# Patient Record
Sex: FEMALE | Race: WHITE | NOT HISPANIC OR LATINO | Employment: OTHER | URBAN - NONMETROPOLITAN AREA
[De-identification: names, ages, dates, MRNs, and addresses within clinical notes are randomized per-mention and may not be internally consistent; named-entity substitution may affect disease eponyms.]

---

## 2017-08-16 ENCOUNTER — TRANSCRIBE ORDERS (OUTPATIENT)
Dept: ADMINISTRATIVE | Facility: HOSPITAL | Age: 57
End: 2017-08-16

## 2017-08-16 DIAGNOSIS — G62.9 NEUROPATHY: Primary | ICD-10-CM

## 2017-10-04 ENCOUNTER — HOSPITAL ENCOUNTER (OUTPATIENT)
Dept: NEUROLOGY | Facility: HOSPITAL | Age: 57
Discharge: HOME | End: 2017-10-04
Admitting: PHYSICIAN ASSISTANT

## 2017-10-04 DIAGNOSIS — G62.9 NEUROPATHY: ICD-10-CM

## 2017-10-04 PROCEDURE — 95909 NRV CNDJ TST 5-6 STUDIES: CPT | Performed by: PSYCHIATRY & NEUROLOGY

## 2017-10-04 PROCEDURE — 95886 MUSC TEST DONE W/N TEST COMP: CPT

## 2017-10-04 PROCEDURE — 95909 NRV CNDJ TST 5-6 STUDIES: CPT

## 2017-10-04 PROCEDURE — 95886 MUSC TEST DONE W/N TEST COMP: CPT | Performed by: PSYCHIATRY & NEUROLOGY

## 2019-04-24 ENCOUNTER — OFFICE VISIT (OUTPATIENT)
Dept: GASTROENTEROLOGY | Facility: CLINIC | Age: 59
End: 2019-04-24

## 2019-04-24 VITALS
SYSTOLIC BLOOD PRESSURE: 124 MMHG | DIASTOLIC BLOOD PRESSURE: 72 MMHG | WEIGHT: 192 LBS | OXYGEN SATURATION: 100 % | BODY MASS INDEX: 32.78 KG/M2 | HEIGHT: 64 IN | HEART RATE: 77 BPM | TEMPERATURE: 98.1 F

## 2019-04-24 DIAGNOSIS — R10.31 RLQ ABDOMINAL PAIN: ICD-10-CM

## 2019-04-24 DIAGNOSIS — K59.09 OTHER CONSTIPATION: Primary | ICD-10-CM

## 2019-04-24 PROCEDURE — 99213 OFFICE O/P EST LOW 20 MIN: CPT | Performed by: NURSE PRACTITIONER

## 2019-04-24 RX ORDER — DICLOFENAC SODIUM 75 MG/1
75 TABLET, DELAYED RELEASE ORAL 2 TIMES DAILY
COMMUNITY
End: 2020-11-20

## 2019-04-24 RX ORDER — LINACLOTIDE 290 UG/1
1 CAPSULE, GELATIN COATED ORAL DAILY
COMMUNITY
Start: 2019-03-26 | End: 2020-11-20

## 2019-04-24 RX ORDER — TOPIRAMATE 25 MG/1
1 TABLET ORAL 3 TIMES DAILY
COMMUNITY
End: 2023-02-22 | Stop reason: HOSPADM

## 2019-04-24 NOTE — PATIENT INSTRUCTIONS
Constipation, Adult  Constipation is when a person:  · Poops (has a bowel movement) fewer times in a week than normal.  · Has a hard time pooping.  · Has poop that is dry, hard, or bigger than normal.    Follow these instructions at home:  Eating and drinking    · Eat foods that have a lot of fiber, such as:  ? Fresh fruits and vegetables.  ? Whole grains.  ? Beans.  · Eat less of foods that are high in fat, low in fiber, or overly processed, such as:  ? French fries.  ? Hamburgers.  ? Cookies.  ? Candy.  ? Soda.  · Drink enough fluid to keep your pee (urine) clear or pale yellow.  General instructions  · Exercise regularly or as told by your doctor.  · Go to the restroom when you feel like you need to poop. Do not hold it in.  · Take over-the-counter and prescription medicines only as told by your doctor. These include any fiber supplements.  · Do pelvic floor retraining exercises, such as:  ? Doing deep breathing while relaxing your lower belly (abdomen).  ? Relaxing your pelvic floor while pooping.  · Watch your condition for any changes.  · Keep all follow-up visits as told by your doctor. This is important.  Contact a doctor if:  · You have pain that gets worse.  · You have a fever.  · You have not pooped for 4 days.  · You throw up (vomit).  · You are not hungry.  · You lose weight.  · You are bleeding from the anus.  · You have thin, pencil-like poop (stool).  Get help right away if:  · You have a fever, and your symptoms suddenly get worse.  · You leak poop or have blood in your poop.  · Your belly feels hard or bigger than normal (is bloated).  · You have very bad belly pain.  · You feel dizzy or you faint.  This information is not intended to replace advice given to you by your health care provider. Make sure you discuss any questions you have with your health care provider.  Document Released: 06/05/2009 Document Revised: 07/07/2017 Document Reviewed: 06/07/2017  [employer] Interactive Patient Education ©  2019 [employer]

## 2019-04-24 NOTE — PROGRESS NOTES
Chief Complaint:   Chief Complaint   Patient presents with   • Constipation     Pt has been having issues with constipation for the last 6 months-states she was started on Plaquenil right before all of this started; pt was put on lactulose-states that didn't help so she was switched to Linzess and that seems to have helped; Pt stopped taking Plaquenil at the beginning of December because of the constipation   • Abdominal Pain     Pt states when her constipation was the worst-she was having a lot of RLQ pain; states it would wake her up in the middle of the night          Patient ID: Peggy Regan is a 58 y.o. female     History of Present Illness: This is a very pleasant 58-year-old female who was referred to our office for constipation.    The patient was seen at [facility] by JEREMÍAS Montague with complaints of  constipation.  She states she had previously been on Plaquenil but stopped taking this due to constipation still having problems with this. She states that her bowel habits had been normal but then began to have very bad constipation.  She states she normally has a bowel movement every other day until that medication was started.  She was initially started on Lactulose which did not help. She states she was still using stool softeners as well as suppositories.  The patient was started on Linzess 290 mcg 1 p.o. daily.  The patient's last colonoscopy was performed on 10/29/2015 with findings of one colon polyp. She states the Linzess worked well and is still working. She states she was initially have RLQ abdominal pain but that has resolved.    The patient denies any nausea, vomiting, epigastric pain, dysphagia, pyrosis or hematemesis.  The patient denies any fever or chills.  Denies any melena or hematochezia.  Denies any unintentional weight loss or loss of appetite.       Past Medical History:   Diagnosis Date   • Asthma    • Colon polyp    • Cough     multifactorial and adversely affected by  shy and obesity   • Degenerative disc disease, lumbar    • Degenerative disc disease, thoracic    • Depression    • Family history of colonic polyps    • Fibromyalgia    • Functional dyspepsia    • GERD (gastroesophageal reflux disease)    • Hypercholesteremia    • Hypertension    • Sleep apnea     CPAP   • Spinal headache    • Wears glasses        Past Surgical History:   Procedure Laterality Date   • ANTERIOR CERVICAL FUSION  2016   •  SECTION      X2   • CHOLECYSTECTOMY     • COLONOSCOPY  10/29/2015    One 5mm polyp in the sigmoid colon-complete resection, polyp tissue not retrieved; Examination was otherwise normal on direct and retroflexion views; Repeat 5 years    • COLONOSCOPY  2003    Normal; Repeat at age 50   • ENDOSCOPY  2011    Normal esophagus; Normal stomach; Normal examined duodenum; BRAVO capsule placed    • ENDOSCOPY  2010    Suboptimal view due to retained gastric contents; Will check SPGE scan; Will repeat endo/MAC with BRAVO replacement   • ENDOSCOPY  2003    Normal; RTC 3 months   • HYSTERECTOMY     • VENTRAL/INCISIONAL HERNIA REPAIR N/A 2016    Procedure: VENTRAL/INCISIONAL HERNIA REPAIR WITH MESH;  Surgeon: Mckenna Adair MD;  Location: [facility] OR;  Service:          Current Outpatient Medications:   •  albuterol (PROAIR RESPICLICK) 108 (90 BASE) MCG/ACT inhaler, Inhale every 4 (four) hours as needed for wheezing., Disp: , Rfl:   •  Blood Glucose Monitoring Suppl (ACCU-CHEK LAVELL SMARTVIEW) W/DEVICE kit, 3 (three) times a day., Disp: , Rfl:   •  buPROPion XL (WELLBUTRIN XL) 300 MG 24 hr tablet, Take 150 mg by mouth Daily., Disp: , Rfl:   •  cetirizine (ZYRTEC ALLERGY) 10 MG tablet, Take 10 mg by mouth Daily., Disp: , Rfl:   •  diclofenac (VOLTAREN) 75 MG EC tablet, Take 75 mg by mouth 2 (Two) Times a Day., Disp: , Rfl:   •  DULoxetine (CYMBALTA) 60 MG capsule, Take 60 mg by mouth Daily., Disp: , Rfl:   •  fluticasone (FLONASE) 50 MCG/ACT nasal spray,  "2 sprays into each nostril 2 (Two) Times a Day., Disp: , Rfl:   •  LINZESS 290 MCG capsule capsule, Take 1 capsule by mouth Daily., Disp: , Rfl:   •  losartan (COZAAR) 25 MG tablet, Take 25 mg by mouth Daily., Disp: , Rfl:   •  metFORMIN (GLUCOPHAGE) 500 MG tablet, Take 1,000 mg by mouth 2 (Two) Times a Day., Disp: , Rfl:   •  montelukast (SINGULAIR) 10 MG tablet, Take 10 mg by mouth Daily., Disp: , Rfl:   •  pantoprazole (PROTONIX) 40 MG EC tablet, Take 40 mg by mouth 2 (Two) Times a Day., Disp: , Rfl:   •  pregabalin (LYRICA) 200 MG capsule, Take 200 mg by mouth 3 (Three) Times a Day., Disp: , Rfl:   •  simvastatin (ZOCOR) 40 MG tablet, Take 20 mg by mouth Daily., Disp: , Rfl:   •  tiZANidine (ZANAFLEX) 4 MG tablet, Take 8 mg by mouth 3 (Three) Times a Day., Disp: , Rfl:   •  Topiramate (TOPAMAX PO), Take 1 tablet by mouth 3 (Three) Times a Day., Disp: , Rfl:     Allergies   Allergen Reactions   • Erythromycin Nausea And Vomiting   • Penicillins Hives   • Tetracyclines & Related Swelling     MOUTH       Social History     Socioeconomic History   • Marital status:      Spouse name: Not on file   • Number of children: Not on file   • Years of education: Not on file   • Highest education level: Not on file   Tobacco Use   • Smoking status: Never Smoker   • Smokeless tobacco: Never Used   Substance and Sexual Activity   • Alcohol use: No   • Drug use: No   • Sexual activity: Defer       Family History   Problem Relation Age of Onset   • Heart disease Mother    • Colon polyps Mother    • Colon polyps Father    • Hypertension Sister    • Colon cancer Neg Hx        Vitals:    04/24/19 0851   BP: 124/72   BP Location: Left arm   Patient Position: Sitting   Cuff Size: Adult   Pulse: 77   Temp: 98.1 °F (36.7 °C)   TempSrc: Tympanic   SpO2: 100%   Weight: 87.1 kg (192 lb)   Height: 162.6 cm (64\")       Review of Systems:    General:    Present -feeling well   Skin:    Not Present-Rash   HEENT:     Not Present-Acute " visual changes or Acute hearing changes   Neck :    Not Present- swollen glands   Genitourinary:      Not Present- burning, frequency, urgency hematuria, dysuria,   Cardiovascular:   Not Present-chest pain, palpitations, or pressure   Respiratory:   Not Present- shortness of breath or cough   Gastrointestinal:  Musculoskeletal:  Neurological:  Psychiatric:   Present as mentioned in the HP    Not Present. Recent gait disturbances.    Not Present-Seizures and weakness in extremities.    Not Present- Anxiety or Depression.       Physical Exam:    General Appearance:    Alert, cooperative, in no acute distress   Psych:    Mood appropriate    Eyes:          conjunctivae and sclerae normal, no   icterus, no pallor   ENMT:    Ears appear intact with no abnormalities noted oral mucosa moist   Neck:   No adenopathy, supple, trachea midline, no thyromegaly, no   carotid bruit, no JVD    Cardiovascular:    Regular rhythm and normal rate, normal S1 and S2, no            murmur, no gallop, no rub, no click   Gastrointestinal:     Inspection normal.  Normal bowel sounds, no masses, no organomegaly, soft round non-tender, non-distended, no guarding, no rebound or tenderness. No hepatosplenomegaly.   Skin:   No bleeding, bruising or rash   Neurologic:   nonfocal       Lab Results   Component Value Date    WBC 6.25 12/20/2016    WBC 6.70 09/14/2016    WBC 8.44 09/13/2016    HGB 10.9 (L) 12/20/2016    HGB 10.0 (L) 09/14/2016    HGB 10.6 (L) 09/13/2016    HCT 32.9 (L) 12/20/2016    HCT 29.3 (L) 09/14/2016    HCT 30.6 (L) 09/13/2016     12/20/2016     09/14/2016     09/13/2016        Lab Results   Component Value Date     12/20/2016     09/15/2016     09/14/2016    K 4.1 12/20/2016    K 4.3 09/15/2016    K 3.3 (L) 09/14/2016    CL 99 12/20/2016     09/15/2016    CL 98 09/14/2016    CO2 27.0 12/20/2016    CO2 26 09/15/2016    CO2 29 09/14/2016    BUN 16 12/20/2016    BUN 12 09/15/2016    BUN  24 (H) 09/14/2016    CREATININE 0.84 12/20/2016    CREATININE 0.78 09/15/2016    CREATININE 1.26 09/14/2016    BILITOT 0.7 09/13/2016    BILITOT 0.5 09/02/2016    ALKPHOS 89 09/13/2016    ALKPHOS 64 09/02/2016    ALT 39 09/13/2016    ALT 63 (H) 09/02/2016    AST 30 09/13/2016    AST 92 (H) 09/02/2016    GLUCOSE 103 (H) 12/20/2016    GLUCOSE 151 (H) 09/15/2016    GLUCOSE 94 09/14/2016       Lab Results   Component Value Date    INR 0.94 09/02/2016       Body mass index is 32.96 kg/m². Patient's Body mass index is 32.96 kg/m². BMI is above normal parameters. Recommendations include: nutrition counseling.    Assessment and Plan:  Assessment/Plan   Peggy was seen today for constipation and abdominal pain.    Diagnoses and all orders for this visit:    Other constipation  Comments:  resolved with Linzess.  I have discussed with the patient should her symptoms return to call and we would see her in the office and reevaluate    RLQ abdominal pain  Comments:  resolved             Patient Instructions   Constipation, Adult  Constipation is when a person:  · Poops (has a bowel movement) fewer times in a week than normal.  · Has a hard time pooping.  · Has poop that is dry, hard, or bigger than normal.    Follow these instructions at home:  Eating and drinking    · Eat foods that have a lot of fiber, such as:  ? Fresh fruits and vegetables.  ? Whole grains.  ? Beans.  · Eat less of foods that are high in fat, low in fiber, or overly processed, such as:  ? French fries.  ? Hamburgers.  ? Cookies.  ? Candy.  ? Soda.  · Drink enough fluid to keep your pee (urine) clear or pale yellow.  General instructions  · Exercise regularly or as told by your doctor.  · Go to the restroom when you feel like you need to poop. Do not hold it in.  · Take over-the-counter and prescription medicines only as told by your doctor. These include any fiber supplements.  · Do pelvic floor retraining exercises, such as:  ? Doing deep breathing while  relaxing your lower belly (abdomen).  ? Relaxing your pelvic floor while pooping.  · Watch your condition for any changes.  · Keep all follow-up visits as told by your doctor. This is important.  Contact a doctor if:  · You have pain that gets worse.  · You have a fever.  · You have not pooped for 4 days.  · You throw up (vomit).  · You are not hungry.  · You lose weight.  · You are bleeding from the anus.  · You have thin, pencil-like poop (stool).  Get help right away if:  · You have a fever, and your symptoms suddenly get worse.  · You leak poop or have blood in your poop.  · Your belly feels hard or bigger than normal (is bloated).  · You have very bad belly pain.  · You feel dizzy or you faint.  This information is not intended to replace advice given to you by your health care provider. Make sure you discuss any questions you have with your health care provider.  Document Released: 06/05/2009 Document Revised: 07/07/2017 Document Reviewed: 06/07/2017  [employer] Interactive Patient Education © 2019 [employer]        Next follow-up appointment          EMR Dragon/Transcription disclaimer:  Much of this encounter note is an electronic transcription/translation of spoken language to printed text. The electronic translation of spoken language may permit erroneous, or at times, nonsensical words or phrases to be inadvertently transcribed; although I have reviewed the note for such errors, some may still exist.

## 2020-11-20 PROCEDURE — 87635 SARS-COV-2 COVID-19 AMP PRB: CPT | Performed by: FAMILY MEDICINE

## 2020-12-30 ENCOUNTER — TELEPHONE (OUTPATIENT)
Dept: GASTROENTEROLOGY | Facility: CLINIC | Age: 60
End: 2020-12-30

## 2020-12-30 NOTE — TELEPHONE ENCOUNTER
I have sent 2 letters to pt re: recall colon and have had no response. I called and spoke to her about it-she was a little hesitant at first due to Covid but is agreeable to scheduling. She tells me she uses [employer] and is required to get authorization from them before scheduling anything. She will call their office to discuss and will call us back to schedule once that is taken care of. Pt advised to call me back with any further questions/problems.

## 2021-03-15 ENCOUNTER — OFFICE VISIT (OUTPATIENT)
Dept: GASTROENTEROLOGY | Facility: CLINIC | Age: 61
End: 2021-03-15

## 2021-03-15 VITALS
BODY MASS INDEX: 34.49 KG/M2 | WEIGHT: 202 LBS | TEMPERATURE: 97 F | SYSTOLIC BLOOD PRESSURE: 122 MMHG | DIASTOLIC BLOOD PRESSURE: 76 MMHG | HEIGHT: 64 IN | OXYGEN SATURATION: 99 % | HEART RATE: 82 BPM

## 2021-03-15 DIAGNOSIS — Z86.010 HX OF COLONIC POLYPS: Primary | ICD-10-CM

## 2021-03-15 DIAGNOSIS — Z83.71 FAMILY HISTORY OF POLYPS IN THE COLON: ICD-10-CM

## 2021-03-15 PROBLEM — Z83.719 FAMILY HISTORY OF POLYPS IN THE COLON: Status: ACTIVE | Noted: 2021-03-15

## 2021-03-15 PROBLEM — Z86.0100 HX OF COLONIC POLYPS: Status: ACTIVE | Noted: 2021-03-15

## 2021-03-15 PROCEDURE — S0285 CNSLT BEFORE SCREEN COLONOSC: HCPCS | Performed by: NURSE PRACTITIONER

## 2021-03-15 RX ORDER — DICLOFENAC SODIUM 75 MG/1
75 TABLET, DELAYED RELEASE ORAL EVERY OTHER DAY
Status: ON HOLD | COMMUNITY
End: 2021-04-28

## 2021-03-15 RX ORDER — TRAZODONE HYDROCHLORIDE 100 MG/1
200 TABLET ORAL NIGHTLY
COMMUNITY
End: 2023-02-22 | Stop reason: HOSPADM

## 2021-03-15 RX ORDER — ERGOCALCIFEROL 1.25 MG/1
50000 CAPSULE ORAL WEEKLY
COMMUNITY

## 2021-03-15 NOTE — PROGRESS NOTES
Chief Complaint   Patient presents with   • Colon Cancer Screening     Pt presents today for colon recall-last colon was 10/29/2015; Personal history of colon polyps; Family history of colon polyps     Subjective   HPI  Peggy Regan is a 60 y.o. female who presents as a referral for preventative maintenance. She has no complaints of nausea or vomiting. No change in bowels. No wt loss. No BRBPR. No melena. There is no family hx for colon cancer. No abdominal pain. There was no Cologuard screening this year.  The patient's last colonoscopy revealed polyp performed on 10/29/2015.  Past Medical History:   Diagnosis Date   • Arthritis    • Asthma    • Cough     multifactorial and adversely affected by shy and obesity   • Degenerative disc disease, lumbar    • Degenerative disc disease, thoracic    • Depression    • Family history of colonic polyps    • Fibromyalgia    • Functional dyspepsia    • GERD (gastroesophageal reflux disease)    • History of colon polyps    • Hypercholesteremia    • Hypertension    • Sleep apnea     CPAP   • Spinal headache    • Type 2 diabetes mellitus (CMS/HCC)    • Wears glasses      Past Surgical History:   Procedure Laterality Date   • ANTERIOR CERVICAL FUSION  2016   • BRAVO PROCEDURE  2011    Borderline test while off PPI; Day 1 suggestive of reflux disease and Day 2 is negative   •  SECTION      X2   • CHOLECYSTECTOMY     • COLONOSCOPY  10/29/2015    One 5mm polyp in the sigmoid colon-complete resection, polyp tissue not retrieved; Examination was otherwise normal on direct and retroflexion views; Repeat 5 years    • COLONOSCOPY  2003    Normal; Repeat at age 50   • COLONOSCOPY  2010    Internal hemorrhoids; One 1cm polyp in the descending colon-path shows benign submucosal leiomyoma; Repeat 3 years   • COLONOSCOPY  1992    Dr. Arredondo-Normal colonoscopy   • ENDOSCOPY  2011    Normal esophagus; Normal stomach; Normal examined duodenum; BRAVO  capsule placed    • ENDOSCOPY  12/01/2010    Suboptimal view due to retained gastric contents; Will check SPGE scan; Will repeat endo/MAC with BRAVO replacement   • ENDOSCOPY  08/18/2003    Normal; RTC 3 months   • ENDOSCOPY  09/22/1994    Dr. Arredondo-Normal endoscopy; Slant taken for H.Pylori   • HYSTERECTOMY     • KNEE ARTHROSCOPY     • VENTRAL/INCISIONAL HERNIA REPAIR N/A 12/28/2016    Procedure: VENTRAL/INCISIONAL HERNIA REPAIR WITH MESH;  Surgeon: Mckenna Adair MD;  Location: [facility] OR;  Service:        Current Outpatient Medications:   •  B Complex Vitamins (VITAMIN B COMPLEX PO), Take 1 capsule by mouth Daily., Disp: , Rfl:   •  Blood Glucose Monitoring Suppl (ACCU-CHEK LAVELL SMARTVIEW) W/DEVICE kit, 3 (three) times a day., Disp: , Rfl:   •  buPROPion XL (WELLBUTRIN XL) 150 MG 24 hr tablet, Take 150 mg by mouth Daily., Disp: , Rfl:   •  busPIRone (BUSPAR) 5 MG tablet, Take 10 mg by mouth 3 (Three) Times a Day., Disp: , Rfl:   •  cetirizine (ZYRTEC ALLERGY) 10 MG tablet, Take 10 mg by mouth Daily., Disp: , Rfl:   •  cyclobenzaprine (FLEXERIL) 10 MG tablet, Take 10 mg by mouth 3 (Three) Times a Day As Needed for Muscle Spasms., Disp: , Rfl:   •  diclofenac (VOLTAREN) 75 MG EC tablet, Take 75 mg by mouth Every Other Day., Disp: , Rfl:   •  DULoxetine (CYMBALTA) 60 MG capsule, Take 120 mg by mouth Daily., Disp: , Rfl:   •  fenofibrate (TRICOR) 145 MG tablet, Take 145 mg by mouth Daily., Disp: , Rfl:   •  hydroxychloroquine (PLAQUENIL) 200 MG tablet, Take 200 mg by mouth 2 (two) times a day., Disp: , Rfl:   •  losartan (COZAAR) 25 MG tablet, Take 25 mg by mouth Daily., Disp: , Rfl:   •  montelukast (SINGULAIR) 10 MG tablet, Take 10 mg by mouth Daily., Disp: , Rfl:   •  pantoprazole (PROTONIX) 40 MG EC tablet, Take 40 mg by mouth 2 (Two) Times a Day., Disp: , Rfl:   •  pregabalin (LYRICA) 200 MG capsule, Take 200 mg by mouth 3 (Three) Times a Day., Disp: , Rfl:   •  simvastatin (ZOCOR) 40 MG tablet, Take 40 mg by  mouth Daily., Disp: , Rfl:   •  Topiramate (TOPAMAX PO), Take 1 tablet by mouth 3 (Three) Times a Day., Disp: , Rfl:   •  traMADol (ULTRAM) 50 MG tablet, Take 50 mg by mouth Every 6 (Six) Hours As Needed for Moderate Pain ., Disp: , Rfl:   •  traZODone (DESYREL) 100 MG tablet, Take 200 mg by mouth Every Night., Disp: , Rfl:   •  vitamin D (ERGOCALCIFEROL) 1.25 MG (86670 UT) capsule capsule, Take 50,000 Units by mouth 1 (One) Time Per Week., Disp: , Rfl:   •  Sodium Sulfate-Mag Sulfate-KCl 6323-487-523 MG tablet, Take 12 tablets by mouth Take As Directed., Disp: 24 tablet, Rfl: 0  Allergies   Allergen Reactions   • Tetracyclines & Related Swelling     Mouth swelling   • Erythromycin Nausea And Vomiting   • Penicillins Hives     Social History     Socioeconomic History   • Marital status:      Spouse name: Not on file   • Number of children: Not on file   • Years of education: Not on file   • Highest education level: Not on file   Tobacco Use   • Smoking status: Never Smoker   • Smokeless tobacco: Never Used   Vaping Use   • Vaping Use: Never used   Substance and Sexual Activity   • Alcohol use: Not Currently   • Drug use: No   • Sexual activity: Defer     Family History   Problem Relation Age of Onset   • Heart disease Mother    • Colon polyps Mother         > 60 years of age    • Colon polyps Father         > 60 years of age    • Hypertension Sister    • Colon cancer Neg Hx    • Esophageal cancer Neg Hx    • Liver cancer Neg Hx    • Liver disease Neg Hx    • Rectal cancer Neg Hx    • Stomach cancer Neg Hx        REVIEW OF SYSTEMS  General: well appearing, no fever chills or sweats, no unexplained wt loss  HEENT: no acute visual or hearing disturbances  Cardiovascular: No chest pain or palpitations  Pulmonary: No shortness of breath, coughing, wheezing or hemoptysis  : No burning, urgency, hematuria, or dysuria  Musculoskeletal: No joint pain or stiffness  Peripheral: no edema  Skin: No lesions or  "rashes  Neuro: No dizziness, headaches, stroke, syncope  Endocrine: No hot or cold intolerances  Hematological: No blood dyscrasias    Objective   Vitals:    03/15/21 0845   BP: 122/76   BP Location: Left arm   Patient Position: Sitting   Cuff Size: Adult   Pulse: 82   Temp: 97 °F (36.1 °C)   TempSrc: Infrared   SpO2: 99%   Weight: 91.6 kg (202 lb)   Height: 162.6 cm (64\")     Body mass index is 34.67 kg/m².    PHYSICAL EXAM  General: age appropriate well nourished well appearing, no acute distress  Head: normocephalic and atraumatic  Global assessment-supple  Neck-No JVD noted, no lymphadenopathy  Pulmonary-clear to auscultation bilaterally, normal respiratory effort  Cardiovascular-normal rate and rhythm, normal heart sounds, S1 and S2 noted  Abdomen-soft, non tender, non distended, normal bowel sounds all 4 quadrants, no hepatosplenomegaly noted  Extremities-No clubbing cyanosis or edema  Neuro-Non focal, converses appropriately, awake, alert, oriented      Imaging Results (Most Recent)     None        Assessment/Plan   Diagnoses and all orders for this visit:    1. Hx of colonic polyps (Primary)  -     Case Request; Standing  -     Case Request    2. Family history of polyps in the colon  -     Case Request; Standing  -     Case Request    Other orders  -     Follow Anesthesia Guidelines / Protocol; Future  -     Obtain Informed Consent; Future  -     Implement Anesthesia Orders Day of Procedure; Standing  -     Obtain Informed Consent; Standing  -     Verify bowel prep was successful; Standing  -     Sodium Sulfate-Mag Sulfate-KCl 7492-514-315 MG tablet; Take 12 tablets by mouth Take As Directed.  Dispense: 24 tablet; Refill: 0      COLONOSCOPY WITH ANESTHESIA (N/A)       Body mass index is 34.67 kg/m². Patient's Body mass index is 34.67 kg/m². BMI is above normal parameters. Recommendations include: nutrition counseling.      All risks, benefits, alternatives, and indications of colonoscopy procedure have been " discussed with the patient. Risks to include perforation of the colon requiring possible surgery or colostomy, risk of bleeding from biopsies or removal of colon tissue, possibility of missing a colon polyp or cancer, or adverse drug reaction.  Benefits to include the diagnosis and management of disease of the colon and rectum. Alternatives to include barium enema, radiographic evaluation, lab testing or no intervention. Pt verbalizes understanding and agrees.

## 2021-04-23 ENCOUNTER — TRANSCRIBE ORDERS (OUTPATIENT)
Dept: LAB | Facility: HOSPITAL | Age: 61
End: 2021-04-23

## 2021-04-23 DIAGNOSIS — Z01.818 PREOPERATIVE TESTING: Primary | ICD-10-CM

## 2021-04-26 ENCOUNTER — LAB (OUTPATIENT)
Dept: LAB | Facility: HOSPITAL | Age: 61
End: 2021-04-26

## 2021-04-26 LAB — SARS-COV-2 ORF1AB RESP QL NAA+PROBE: NOT DETECTED

## 2021-04-26 PROCEDURE — U0004 COV-19 TEST NON-CDC HGH THRU: HCPCS | Performed by: INTERNAL MEDICINE

## 2021-04-26 PROCEDURE — C9803 HOPD COVID-19 SPEC COLLECT: HCPCS | Performed by: INTERNAL MEDICINE

## 2021-04-28 ENCOUNTER — ANESTHESIA EVENT (OUTPATIENT)
Dept: GASTROENTEROLOGY | Facility: HOSPITAL | Age: 61
End: 2021-04-28

## 2021-04-28 ENCOUNTER — ANESTHESIA (OUTPATIENT)
Dept: GASTROENTEROLOGY | Facility: HOSPITAL | Age: 61
End: 2021-04-28

## 2021-04-28 ENCOUNTER — HOSPITAL ENCOUNTER (OUTPATIENT)
Facility: HOSPITAL | Age: 61
Setting detail: HOSPITAL OUTPATIENT SURGERY
Discharge: HOME | End: 2021-04-28
Attending: INTERNAL MEDICINE | Admitting: INTERNAL MEDICINE

## 2021-04-28 VITALS
SYSTOLIC BLOOD PRESSURE: 125 MMHG | BODY MASS INDEX: 35.34 KG/M2 | DIASTOLIC BLOOD PRESSURE: 80 MMHG | HEIGHT: 64 IN | HEART RATE: 76 BPM | TEMPERATURE: 97.5 F | RESPIRATION RATE: 15 BRPM | OXYGEN SATURATION: 98 % | WEIGHT: 207 LBS

## 2021-04-28 DIAGNOSIS — Z83.71 FAMILY HISTORY OF POLYPS IN THE COLON: ICD-10-CM

## 2021-04-28 DIAGNOSIS — Z86.010 HX OF COLONIC POLYPS: ICD-10-CM

## 2021-04-28 LAB — GLUCOSE BLDC GLUCOMTR-MCNC: 104 MG/DL (ref 70–130)

## 2021-04-28 PROCEDURE — 25010000002 PROPOFOL 10 MG/ML EMULSION: Performed by: NURSE ANESTHETIST, CERTIFIED REGISTERED

## 2021-04-28 PROCEDURE — 45385 COLONOSCOPY W/LESION REMOVAL: CPT | Performed by: INTERNAL MEDICINE

## 2021-04-28 PROCEDURE — 88305 TISSUE EXAM BY PATHOLOGIST: CPT | Performed by: INTERNAL MEDICINE

## 2021-04-28 PROCEDURE — 82962 GLUCOSE BLOOD TEST: CPT

## 2021-04-28 RX ORDER — LIDOCAINE HYDROCHLORIDE 10 MG/ML
0.5 INJECTION, SOLUTION EPIDURAL; INFILTRATION; INTRACAUDAL; PERINEURAL ONCE AS NEEDED
Status: DISCONTINUED | OUTPATIENT
Start: 2021-04-28 | End: 2021-04-28 | Stop reason: HOSPADM

## 2021-04-28 RX ORDER — SODIUM CHLORIDE 0.9 % (FLUSH) 0.9 %
10 SYRINGE (ML) INJECTION AS NEEDED
Status: DISCONTINUED | OUTPATIENT
Start: 2021-04-28 | End: 2021-04-28 | Stop reason: HOSPADM

## 2021-04-28 RX ORDER — PROPOFOL 10 MG/ML
VIAL (ML) INTRAVENOUS AS NEEDED
Status: DISCONTINUED | OUTPATIENT
Start: 2021-04-28 | End: 2021-04-28 | Stop reason: SURG

## 2021-04-28 RX ORDER — LIDOCAINE HYDROCHLORIDE 20 MG/ML
INJECTION, SOLUTION EPIDURAL; INFILTRATION; INTRACAUDAL; PERINEURAL AS NEEDED
Status: DISCONTINUED | OUTPATIENT
Start: 2021-04-28 | End: 2021-04-28 | Stop reason: SURG

## 2021-04-28 RX ORDER — SODIUM CHLORIDE 9 MG/ML
500 INJECTION, SOLUTION INTRAVENOUS CONTINUOUS PRN
Status: DISCONTINUED | OUTPATIENT
Start: 2021-04-28 | End: 2021-04-28 | Stop reason: HOSPADM

## 2021-04-28 RX ADMIN — SODIUM CHLORIDE 500 ML: 9 INJECTION, SOLUTION INTRAVENOUS at 09:42

## 2021-04-28 RX ADMIN — PROPOFOL 300 MG: 10 INJECTION, EMULSION INTRAVENOUS at 10:00

## 2021-04-28 RX ADMIN — LIDOCAINE HYDROCHLORIDE 100 MG: 20 INJECTION, SOLUTION EPIDURAL; INFILTRATION; INTRACAUDAL; PERINEURAL at 10:00

## 2021-04-28 NOTE — ANESTHESIA POSTPROCEDURE EVALUATION
Patient: Peggy Regan    Procedure Summary     Date: 04/28/21 Room / Location: [facility] ENDOSCOPY 6 / [facility] ENDOSCOPY    Anesthesia Start: 0955 Anesthesia Stop: 1020    Procedure: COLONOSCOPY WITH ANESTHESIA (N/A ) Diagnosis:       Hx of colonic polyps      Family history of polyps in the colon      (Hx of colonic polyps [Z86.010])      (Family history of polyps in the colon [Z83.71])    Surgeons: Peggy Zuñiga MD Provider: Beti Alcazar CRNA    Anesthesia Type: MAC ASA Status: 3          Anesthesia Type: MAC    Vitals  Vitals Value Taken Time   /69 04/28/21 1040   Temp     Pulse 74 04/28/21 1041   Resp 15 04/28/21 1035   SpO2 100 % 04/28/21 1041   Vitals shown include unvalidated device data.        Post Anesthesia Care and Evaluation    Patient location during evaluation: PHASE II  Patient participation: complete - patient participated  Level of consciousness: awake and alert  Pain management: adequate  Airway patency: patent  Anesthetic complications: No anesthetic complications  PONV Status: none  Cardiovascular status: acceptable  Respiratory status: acceptable  Hydration status: acceptable  No anesthesia care post op

## 2021-04-28 NOTE — ANESTHESIA PREPROCEDURE EVALUATION
Anesthesia Evaluation     Patient summary reviewed   no history of anesthetic complications:  NPO Solid Status: > 8 hours             Airway   Mallampati: II  TM distance: >3 FB  Neck ROM: full  Dental      Pulmonary    (+) sleep apnea on CPAP,   Cardiovascular   Exercise tolerance: excellent (>7 METS)    (+) hypertension, hyperlipidemia,       Neuro/Psych- negative ROS  GI/Hepatic/Renal/Endo    (+) obesity,  GERD,  renal disease CRI, diabetes mellitus,     Musculoskeletal     Abdominal    Substance History      OB/GYN          Other                        Anesthesia Plan    ASA 3     MAC       Anesthetic plan, all risks, benefits, and alternatives have been provided, discussed and informed consent has been obtained with: patient.

## 2021-04-30 LAB
CYTO UR: NORMAL
LAB AP CASE REPORT: NORMAL
PATH REPORT.FINAL DX SPEC: NORMAL
PATH REPORT.GROSS SPEC: NORMAL

## 2023-01-12 ENCOUNTER — TRANSCRIBE ORDERS (OUTPATIENT)
Dept: ADMINISTRATIVE | Facility: HOSPITAL | Age: 63
End: 2023-01-12
Payer: MEDICARE

## 2023-01-12 DIAGNOSIS — R41.3 MEMORY LOSS OR IMPAIRMENT: Primary | ICD-10-CM

## 2023-01-18 ENCOUNTER — HOSPITAL ENCOUNTER (OUTPATIENT)
Dept: ULTRASOUND IMAGING | Facility: HOSPITAL | Age: 63
Discharge: HOME | End: 2023-01-18
Admitting: NURSE PRACTITIONER
Payer: COMMERCIAL

## 2023-01-18 DIAGNOSIS — R41.3 MEMORY LOSS OR IMPAIRMENT: ICD-10-CM

## 2023-01-18 PROCEDURE — 93880 EXTRACRANIAL BILAT STUDY: CPT

## 2023-01-18 PROCEDURE — 93880 EXTRACRANIAL BILAT STUDY: CPT | Performed by: SURGERY

## 2023-02-02 ENCOUNTER — TELEPHONE (OUTPATIENT)
Dept: VASCULAR SURGERY | Facility: CLINIC | Age: 63
End: 2023-02-02
Payer: MEDICARE

## 2023-02-13 ENCOUNTER — TELEPHONE (OUTPATIENT)
Dept: VASCULAR SURGERY | Facility: CLINIC | Age: 63
End: 2023-02-13
Payer: MEDICARE

## 2023-02-14 ENCOUNTER — OFFICE VISIT (OUTPATIENT)
Dept: VASCULAR SURGERY | Facility: CLINIC | Age: 63
End: 2023-02-14
Payer: COMMERCIAL

## 2023-02-14 VITALS
SYSTOLIC BLOOD PRESSURE: 138 MMHG | HEIGHT: 65 IN | WEIGHT: 198 LBS | HEART RATE: 94 BPM | OXYGEN SATURATION: 98 % | BODY MASS INDEX: 32.99 KG/M2 | DIASTOLIC BLOOD PRESSURE: 82 MMHG

## 2023-02-14 DIAGNOSIS — I65.23 BILATERAL CAROTID ARTERY STENOSIS: Primary | ICD-10-CM

## 2023-02-14 DIAGNOSIS — E78.2 MIXED HYPERLIPIDEMIA: ICD-10-CM

## 2023-02-14 DIAGNOSIS — I10 ESSENTIAL HYPERTENSION: ICD-10-CM

## 2023-02-14 PROCEDURE — 99204 OFFICE O/P NEW MOD 45 MIN: CPT | Performed by: SURGERY

## 2023-02-14 RX ORDER — ASPIRIN 81 MG/1
81 TABLET ORAL DAILY
Start: 2023-02-14

## 2023-02-14 NOTE — PROGRESS NOTES
2023      Radha Pepe APRN  [address] [place] [address]    Peggy Regan  1960    Chief Complaint   Patient presents with   • NEW PATIENT     Referred by Radha Pepe for Carotid Stenosis. States that she's been having blurred vision and lots of headaches.  Also loosing her memory/ confusion, seems to be getting worse.        Dear JEREMÍAS Wilkes    HPI  I had the pleasure of seeing your patient Peggy Regan in the office today.  Thank you kindly for this consultation.  As you recall, Peggy Regan is a 62 y.o.  female who you are currently following for routine health maintenance.  She was having complaints of headaches, confusion, and memory loss. Her blood pressure is stable.  She is maintained on Tricor and Zocor. She did have noninvasive testing performed, which I did personally review.       Past Medical History:   Diagnosis Date   • Arthritis    • Asthma    • Chronic kidney failure     stage 3a   • Cough     multifactorial and adversely affected by shy and obesity   • Degenerative disc disease, lumbar    • Degenerative disc disease, thoracic    • Depression    • Family history of colonic polyps    • Fibromyalgia    • Functional dyspepsia    • GERD (gastroesophageal reflux disease)    • History of colon polyps    • Hypercholesteremia    • Hypertension    • Sleep apnea     CPAP   • Spinal headache    • Type 2 diabetes mellitus (HCC)    • Wears glasses        Past Surgical History:   Procedure Laterality Date   • ANTERIOR CERVICAL FUSION  2016   • BRAVO PROCEDURE  2011    Borderline test while off PPI; Day 1 suggestive of reflux disease and Day 2 is negative   •  SECTION      X2   • CHOLECYSTECTOMY     • COLONOSCOPY  10/29/2015    One 5mm polyp in the sigmoid colon-complete resection, polyp tissue not retrieved; Examination was otherwise normal on direct and retroflexion views; Repeat 5 years    • COLONOSCOPY  2003    Normal; Repeat at  age 50   • COLONOSCOPY  11/01/2010    Internal hemorrhoids; One 1cm polyp in the descending colon-path shows benign submucosal leiomyoma; Repeat 3 years   • COLONOSCOPY  06/16/1992    Dr. Morales colonoscopy   • COLONOSCOPY N/A 4/28/2021    Procedure: COLONOSCOPY WITH ANESTHESIA;  Surgeon: Peggy Zuñiga MD;  Location: [facility] ENDOSCOPY;  Service: Gastroenterology;  Laterality: N/A;  preop; hx of polyps  postop  PCP none    • ENDOSCOPY  01/11/2011    Normal esophagus; Normal stomach; Normal examined duodenum; BRAVO capsule placed    • ENDOSCOPY  12/01/2010    Suboptimal view due to retained gastric contents; Will check SPGE scan; Will repeat endo/MAC with BRAVO replacement   • ENDOSCOPY  08/18/2003    Normal; RTC 3 months   • ENDOSCOPY  09/22/1994    Dr. Morales endoscopy; Slant taken for H.Pylori   • HYSTERECTOMY     • KNEE ARTHROSCOPY     • VENTRAL/INCISIONAL HERNIA REPAIR N/A 12/28/2016    Procedure: VENTRAL/INCISIONAL HERNIA REPAIR WITH MESH;  Surgeon: Mckenna Adair MD;  Location: [facility] OR;  Service:        Family History   Problem Relation Age of Onset   • Heart disease Mother    • Colon polyps Mother         > 60 years of age    • Colon polyps Father         > 60 years of age    • Hypertension Sister    • Colon cancer Neg Hx    • Esophageal cancer Neg Hx    • Liver cancer Neg Hx    • Liver disease Neg Hx    • Rectal cancer Neg Hx    • Stomach cancer Neg Hx        Social History     Socioeconomic History   • Marital status:    Tobacco Use   • Smoking status: Never   • Smokeless tobacco: Never   Vaping Use   • Vaping Use: Never used   Substance and Sexual Activity   • Alcohol use: Not Currently   • Drug use: No   • Sexual activity: Defer       Allergies   Allergen Reactions   • Tetracyclines & Related Swelling     Mouth swelling   • Erythromycin Nausea And Vomiting   • Penicillins Hives         Current Outpatient Medications:   •  B Complex Vitamins (VITAMIN B COMPLEX PO), Take 1 capsule by  mouth Daily., Disp: , Rfl:   •  Blood Glucose Monitoring Suppl (ACCU-CHEK LAVELL SMARTVIEW) W/DEVICE kit, 3 (three) times a day., Disp: , Rfl:   •  buPROPion XL (WELLBUTRIN XL) 150 MG 24 hr tablet, Take 150 mg by mouth Daily., Disp: , Rfl:   •  busPIRone (BUSPAR) 5 MG tablet, Take 10 mg by mouth 3 (Three) Times a Day., Disp: , Rfl:   •  cetirizine (zyrTEC) 10 MG tablet, Take 10 mg by mouth Daily., Disp: , Rfl:   •  cyclobenzaprine (FLEXERIL) 10 MG tablet, Take 10 mg by mouth 3 (Three) Times a Day As Needed for Muscle Spasms., Disp: , Rfl:   •  DULoxetine (CYMBALTA) 60 MG capsule, Take 120 mg by mouth Daily., Disp: , Rfl:   •  hydroxychloroquine (PLAQUENIL) 200 MG tablet, Take 200 mg by mouth 2 (two) times a day., Disp: , Rfl:   •  losartan (COZAAR) 25 MG tablet, Take 25 mg by mouth Daily., Disp: , Rfl:   •  montelukast (SINGULAIR) 10 MG tablet, Take 10 mg by mouth Daily., Disp: , Rfl:   •  pantoprazole (PROTONIX) 40 MG EC tablet, Take 40 mg by mouth 2 (Two) Times a Day., Disp: , Rfl:   •  pregabalin (LYRICA) 200 MG capsule, Take 200 mg by mouth 3 (Three) Times a Day., Disp: , Rfl:   •  simvastatin (ZOCOR) 40 MG tablet, Take 40 mg by mouth Daily., Disp: , Rfl:   •  traMADol (ULTRAM) 50 MG tablet, Take 50 mg by mouth Every 6 (Six) Hours As Needed for Moderate Pain ., Disp: , Rfl:   •  traZODone (DESYREL) 100 MG tablet, Take 200 mg by mouth Every Night., Disp: , Rfl:   •  vitamin D (ERGOCALCIFEROL) 1.25 MG (44933 UT) capsule capsule, Take 50,000 Units by mouth 1 (One) Time Per Week., Disp: , Rfl:   •  aspirin 81 MG EC tablet, Take 1 tablet by mouth Daily., Disp: , Rfl:   •  fenofibrate (TRICOR) 145 MG tablet, Take 145 mg by mouth Daily., Disp: , Rfl:   •  Topiramate (TOPAMAX PO), Take 1 tablet by mouth 3 (Three) Times a Day., Disp: , Rfl:     Review of Systems   Constitutional: Negative.    Eyes: Negative.    Respiratory: Negative.    Cardiovascular: Negative.    Gastrointestinal: Negative.    Endocrine: Negative.   "  Genitourinary: Negative.    Musculoskeletal: Negative.    Skin: Negative.    Allergic/Immunologic: Negative.    Neurological: Positive for headaches.        Memory loss   Hematological: Negative.    Psychiatric/Behavioral: Negative.    All other systems reviewed and are negative.    /82   Pulse 94   Ht 165.1 cm (65\")   Wt 89.8 kg (198 lb)   SpO2 98%   BMI 32.95 kg/m²     Physical Exam  Vitals and nursing note reviewed.   Constitutional:       Appearance: Normal appearance. She is well-developed. She is obese.   HENT:      Head: Normocephalic and atraumatic.   Eyes:      General: No scleral icterus.     Pupils: Pupils are equal, round, and reactive to light.   Neck:      Thyroid: No thyromegaly.      Vascular: No carotid bruit or JVD.   Cardiovascular:      Rate and Rhythm: Normal rate and regular rhythm.      Pulses:           Carotid pulses are 2+ on the right side and 2+ on the left side.       Femoral pulses are 2+ on the right side and 2+ on the left side.       Popliteal pulses are 2+ on the right side and 2+ on the left side.        Dorsalis pedis pulses are 2+ on the right side and 2+ on the left side.        Posterior tibial pulses are 2+ on the right side and 2+ on the left side.      Heart sounds: Normal heart sounds.   Pulmonary:      Effort: Pulmonary effort is normal.      Breath sounds: Normal breath sounds.   Abdominal:      General: Bowel sounds are normal. There is no distension or abdominal bruit.      Palpations: Abdomen is soft. There is no mass.      Tenderness: There is no abdominal tenderness.   Musculoskeletal:         General: Normal range of motion.      Cervical back: Neck supple.   Lymphadenopathy:      Cervical: No cervical adenopathy.   Skin:     General: Skin is warm and dry.   Neurological:      Mental Status: She is alert and oriented to person, place, and time.      Cranial Nerves: No cranial nerve deficit.      Sensory: No sensory deficit.   Psychiatric:         Mood " and Affect: Mood normal.         Behavior: Behavior normal.         Thought Content: Thought content normal.         Judgment: Judgment normal.       Diagnostic Data:  US Carotid Bilateral    Result Date: 2023  Narrative: History: Carotid occlusive disease      Impression: Impression: 1. There is less than 50% stenosis of the right internal carotid artery. 2. There is 50-69% stenosis of the left internal carotid artery. 3. Antegrade flow is demonstrated in bilateral vertebral arteries.  Comments: Bilateral carotid vertebral arterial duplex scan was performed.  Grayscale imaging shows intimal thickening and calcified elements at the carotid bifurcation. The right internal carotid artery peak systolic velocity is 85.6 cm/sec. The end-diastolic velocity is 27.1 cm/sec. The right ICA/CCA ratio is approximately 1.0 . These findings correlate with less than 50% stenosis of the right internal carotid artery.  Grayscale imaging shows intimal thickening and calcified elements at the carotid bifurcation. The left internal carotid artery peak systolic velocity is 125 cm/sec. The end-diastolic velocity is 44.7 cm/sec. The left ICA/CCA ratio is approximately 1.6 . These findings correlate with 50-69% stenosis of the left internal carotid artery.  Antegrade flow is demonstrated in bilateral vertebral arteries.  This report was finalized on 2023 14:56 by Dr. Prashanth Beckwith MD.       Patient Active Problem List   Diagnosis   • Chest pain   • Essential hypertension   • Mixed hyperlipidemia   • Shortness of breath   • Intermittent palpitations   • Fibromyalgia   • Depression   • DDD (degenerative disc disease), cervical   • DDD (degenerative disc disease), lumbosacral   • GERD (gastroesophageal reflux disease)   • ANGELLA (obstructive sleep apnea)   • Asthma   • Syncope   • S/P GIOVANNA-BSO   • S/P cervical spinal fusion   • S/P  section   • Hx of colonic polyps   • Family history of polyps in the colon        Diagnosis  Plan   1. Bilateral carotid artery stenosis  US Carotid Bilateral      2. Essential hypertension        3. Mixed hyperlipidemia            Plan: After thoroughly evaluating Peggy Regan, I believe the best course of action is to remain conservative from a vascular surgery standpoint. I did review her testing which shows less than 50% right carotid stenosis and 50-69% left carotid stenosis.  Left carotid is widely patent.  I would recommend she begin taking aspirin EC 81 mg daily.  She has an appointment with neurology in April.  I will see her back in 1 year with repeat noninvasive testing including a carotid duplex.  I did discuss vascular risk factors as they pertain to the progression of vascular disease including controlling her hypertension and hyperlipidemia.  These risk factors are currently stable.  The patient is to continue taking their medications as previously discussed.   This was all discussed in full with complete understanding.  Thank you for allowing me to participate in the care of your patient.  Please do not hesitate to call with any questions or concerns.  We will keep you aware of any further encounters with Peggy Regan.        Sincerely yours,         Edward Martinez, DO Pepe, JEREMÍAS Peña

## 2023-02-19 ENCOUNTER — HOSPITAL ENCOUNTER (EMERGENCY)
Facility: HOSPITAL | Age: 63
Discharge: HOME | End: 2023-02-19
Attending: EMERGENCY MEDICINE | Admitting: EMERGENCY MEDICINE
Payer: COMMERCIAL

## 2023-02-19 VITALS
RESPIRATION RATE: 16 BRPM | HEIGHT: 65 IN | BODY MASS INDEX: 32.65 KG/M2 | DIASTOLIC BLOOD PRESSURE: 77 MMHG | TEMPERATURE: 98.5 F | HEART RATE: 88 BPM | SYSTOLIC BLOOD PRESSURE: 139 MMHG | WEIGHT: 196 LBS | OXYGEN SATURATION: 96 %

## 2023-02-19 DIAGNOSIS — R55 SYNCOPE AND COLLAPSE: Primary | ICD-10-CM

## 2023-02-19 LAB
ALBUMIN SERPL-MCNC: 4.5 G/DL (ref 3.5–5.2)
ALBUMIN/GLOB SERPL: 1.5 G/DL
ALP SERPL-CCNC: 47 U/L (ref 39–117)
ALT SERPL W P-5'-P-CCNC: 10 U/L (ref 1–33)
ANION GAP SERPL CALCULATED.3IONS-SCNC: 14 MMOL/L (ref 5–15)
AST SERPL-CCNC: 21 U/L (ref 1–32)
BASOPHILS # BLD AUTO: 0.05 10*3/MM3 (ref 0–0.2)
BASOPHILS NFR BLD AUTO: 1.1 % (ref 0–1.5)
BILIRUB SERPL-MCNC: 0.3 MG/DL (ref 0–1.2)
BUN SERPL-MCNC: 16 MG/DL (ref 8–23)
BUN/CREAT SERPL: 18 (ref 7–25)
CALCIUM SPEC-SCNC: 9.5 MG/DL (ref 8.6–10.5)
CHLORIDE SERPL-SCNC: 103 MMOL/L (ref 98–107)
CO2 SERPL-SCNC: 23 MMOL/L (ref 22–29)
CREAT SERPL-MCNC: 0.89 MG/DL (ref 0.57–1)
D DIMER PPP FEU-MCNC: 0.39 MCGFEU/ML (ref 0–0.62)
DEPRECATED RDW RBC AUTO: 38.8 FL (ref 37–54)
EGFRCR SERPLBLD CKD-EPI 2021: 73.4 ML/MIN/1.73
EOSINOPHIL # BLD AUTO: 0.02 10*3/MM3 (ref 0–0.4)
EOSINOPHIL NFR BLD AUTO: 0.4 % (ref 0.3–6.2)
ERYTHROCYTE [DISTWIDTH] IN BLOOD BY AUTOMATED COUNT: 12.7 % (ref 12.3–15.4)
GLOBULIN UR ELPH-MCNC: 3.1 GM/DL
GLUCOSE SERPL-MCNC: 146 MG/DL (ref 65–99)
HCT VFR BLD AUTO: 34.9 % (ref 34–46.6)
HGB BLD-MCNC: 11.7 G/DL (ref 12–15.9)
HOLD SPECIMEN: NORMAL
HOLD SPECIMEN: NORMAL
IMM GRANULOCYTES # BLD AUTO: 0.01 10*3/MM3 (ref 0–0.05)
IMM GRANULOCYTES NFR BLD AUTO: 0.2 % (ref 0–0.5)
LYMPHOCYTES # BLD AUTO: 0.75 10*3/MM3 (ref 0.7–3.1)
LYMPHOCYTES NFR BLD AUTO: 16.2 % (ref 19.6–45.3)
MAGNESIUM SERPL-MCNC: 2 MG/DL (ref 1.6–2.4)
MCH RBC QN AUTO: 28.5 PG (ref 26.6–33)
MCHC RBC AUTO-ENTMCNC: 33.5 G/DL (ref 31.5–35.7)
MCV RBC AUTO: 84.9 FL (ref 79–97)
MONOCYTES # BLD AUTO: 0.24 10*3/MM3 (ref 0.1–0.9)
MONOCYTES NFR BLD AUTO: 5.2 % (ref 5–12)
NEUTROPHILS NFR BLD AUTO: 3.55 10*3/MM3 (ref 1.7–7)
NEUTROPHILS NFR BLD AUTO: 76.9 % (ref 42.7–76)
NRBC BLD AUTO-RTO: 0 /100 WBC (ref 0–0.2)
NT-PROBNP SERPL-MCNC: 150.2 PG/ML (ref 0–900)
PLATELET # BLD AUTO: 204 10*3/MM3 (ref 140–450)
PMV BLD AUTO: 9.9 FL (ref 6–12)
POTASSIUM SERPL-SCNC: 3.7 MMOL/L (ref 3.5–5.2)
PROT SERPL-MCNC: 7.6 G/DL (ref 6–8.5)
RBC # BLD AUTO: 4.11 10*6/MM3 (ref 3.77–5.28)
SODIUM SERPL-SCNC: 140 MMOL/L (ref 136–145)
T4 FREE SERPL-MCNC: 1.3 NG/DL (ref 0.93–1.7)
TROPONIN T SERPL HS-MCNC: <6 NG/L
TSH SERPL DL<=0.05 MIU/L-ACNC: 1.95 UIU/ML (ref 0.27–4.2)
WBC NRBC COR # BLD: 4.62 10*3/MM3 (ref 3.4–10.8)
WHOLE BLOOD HOLD COAG: NORMAL
WHOLE BLOOD HOLD SPECIMEN: NORMAL

## 2023-02-19 PROCEDURE — 84439 ASSAY OF FREE THYROXINE: CPT | Performed by: EMERGENCY MEDICINE

## 2023-02-19 PROCEDURE — 93010 ELECTROCARDIOGRAM REPORT: CPT | Performed by: INTERNAL MEDICINE

## 2023-02-19 PROCEDURE — 99284 EMERGENCY DEPT VISIT MOD MDM: CPT

## 2023-02-19 PROCEDURE — 83735 ASSAY OF MAGNESIUM: CPT | Performed by: EMERGENCY MEDICINE

## 2023-02-19 PROCEDURE — 83880 ASSAY OF NATRIURETIC PEPTIDE: CPT | Performed by: EMERGENCY MEDICINE

## 2023-02-19 PROCEDURE — 84484 ASSAY OF TROPONIN QUANT: CPT | Performed by: EMERGENCY MEDICINE

## 2023-02-19 PROCEDURE — 84443 ASSAY THYROID STIM HORMONE: CPT | Performed by: EMERGENCY MEDICINE

## 2023-02-19 PROCEDURE — 85379 FIBRIN DEGRADATION QUANT: CPT | Performed by: EMERGENCY MEDICINE

## 2023-02-19 PROCEDURE — 80053 COMPREHEN METABOLIC PANEL: CPT | Performed by: EMERGENCY MEDICINE

## 2023-02-19 PROCEDURE — 83036 HEMOGLOBIN GLYCOSYLATED A1C: CPT | Performed by: INTERNAL MEDICINE

## 2023-02-19 PROCEDURE — 93005 ELECTROCARDIOGRAM TRACING: CPT

## 2023-02-19 PROCEDURE — 85025 COMPLETE CBC W/AUTO DIFF WBC: CPT | Performed by: EMERGENCY MEDICINE

## 2023-02-19 RX ORDER — SODIUM CHLORIDE 0.9 % (FLUSH) 0.9 %
10 SYRINGE (ML) INJECTION AS NEEDED
Status: DISCONTINUED | OUTPATIENT
Start: 2023-02-19 | End: 2023-02-19 | Stop reason: HOSPADM

## 2023-02-20 ENCOUNTER — HOSPITAL ENCOUNTER (OUTPATIENT)
Facility: HOSPITAL | Age: 63
Discharge: HOME | End: 2023-02-22
Attending: FAMILY MEDICINE | Admitting: FAMILY MEDICINE
Payer: COMMERCIAL

## 2023-02-20 DIAGNOSIS — R55 SYNCOPE, UNSPECIFIED SYNCOPE TYPE: ICD-10-CM

## 2023-02-20 DIAGNOSIS — M79.7 FIBROMYALGIA: Primary | Chronic | ICD-10-CM

## 2023-02-20 DIAGNOSIS — R55 RECURRENT SYNCOPE: ICD-10-CM

## 2023-02-20 LAB
CHOLEST SERPL-MCNC: 203 MG/DL (ref 0–200)
HBA1C MFR BLD: 5.1 % (ref 4.8–5.6)
HDLC SERPL-MCNC: 63 MG/DL (ref 40–60)
LDLC SERPL CALC-MCNC: 119 MG/DL (ref 0–100)
LDLC/HDLC SERPL: 1.85 {RATIO}
QT INTERVAL: 388 MS
QTC INTERVAL: 409 MS
TRIGL SERPL-MCNC: 118 MG/DL (ref 0–150)
VLDLC SERPL-MCNC: 21 MG/DL (ref 5–40)

## 2023-02-20 PROCEDURE — 96361 HYDRATE IV INFUSION ADD-ON: CPT

## 2023-02-20 PROCEDURE — 93010 ELECTROCARDIOGRAM REPORT: CPT | Performed by: INTERNAL MEDICINE

## 2023-02-20 PROCEDURE — 25010000002 ENOXAPARIN PER 10 MG: Performed by: FAMILY MEDICINE

## 2023-02-20 PROCEDURE — G0378 HOSPITAL OBSERVATION PER HR: HCPCS

## 2023-02-20 PROCEDURE — 96360 HYDRATION IV INFUSION INIT: CPT

## 2023-02-20 PROCEDURE — 80061 LIPID PANEL: CPT | Performed by: INTERNAL MEDICINE

## 2023-02-20 PROCEDURE — 93005 ELECTROCARDIOGRAM TRACING: CPT | Performed by: FAMILY MEDICINE

## 2023-02-20 PROCEDURE — 99204 OFFICE O/P NEW MOD 45 MIN: CPT | Performed by: PSYCHIATRY & NEUROLOGY

## 2023-02-20 PROCEDURE — 96372 THER/PROPH/DIAG INJ SC/IM: CPT

## 2023-02-20 RX ORDER — TRAMADOL HYDROCHLORIDE 50 MG/1
50 TABLET ORAL EVERY 6 HOURS PRN
Status: DISCONTINUED | OUTPATIENT
Start: 2023-02-20 | End: 2023-02-20

## 2023-02-20 RX ORDER — TRIAMCINOLONE ACETONIDE 40 MG/ML
40 INJECTION, SUSPENSION INTRA-ARTICULAR; INTRAMUSCULAR ONCE
Status: DISCONTINUED | OUTPATIENT
Start: 2023-02-20 | End: 2023-02-22 | Stop reason: HOSPADM

## 2023-02-20 RX ORDER — PANTOPRAZOLE SODIUM 40 MG/1
40 TABLET, DELAYED RELEASE ORAL
Status: DISCONTINUED | OUTPATIENT
Start: 2023-02-21 | End: 2023-02-22 | Stop reason: HOSPADM

## 2023-02-20 RX ORDER — SODIUM CHLORIDE 9 MG/ML
100 INJECTION, SOLUTION INTRAVENOUS CONTINUOUS
Status: DISCONTINUED | OUTPATIENT
Start: 2023-02-20 | End: 2023-02-22 | Stop reason: HOSPADM

## 2023-02-20 RX ORDER — SODIUM BICARBONATE 325 MG/1
325 TABLET ORAL 2 TIMES DAILY
COMMUNITY

## 2023-02-20 RX ORDER — DULOXETIN HYDROCHLORIDE 30 MG/1
120 CAPSULE, DELAYED RELEASE ORAL DAILY
Status: DISCONTINUED | OUTPATIENT
Start: 2023-02-20 | End: 2023-02-22 | Stop reason: HOSPADM

## 2023-02-20 RX ORDER — SODIUM CHLORIDE 9 MG/ML
40 INJECTION, SOLUTION INTRAVENOUS AS NEEDED
Status: DISCONTINUED | OUTPATIENT
Start: 2023-02-20 | End: 2023-02-22 | Stop reason: HOSPADM

## 2023-02-20 RX ORDER — SODIUM CHLORIDE 0.9 % (FLUSH) 0.9 %
10 SYRINGE (ML) INJECTION EVERY 12 HOURS SCHEDULED
Status: DISCONTINUED | OUTPATIENT
Start: 2023-02-20 | End: 2023-02-22 | Stop reason: HOSPADM

## 2023-02-20 RX ORDER — BUPROPION HYDROCHLORIDE 150 MG/1
150 TABLET ORAL DAILY
Status: DISCONTINUED | OUTPATIENT
Start: 2023-02-20 | End: 2023-02-22 | Stop reason: HOSPADM

## 2023-02-20 RX ORDER — ATORVASTATIN CALCIUM 10 MG/1
20 TABLET, FILM COATED ORAL NIGHTLY
Status: DISCONTINUED | OUTPATIENT
Start: 2023-02-21 | End: 2023-02-21

## 2023-02-20 RX ORDER — CYCLOBENZAPRINE HCL 10 MG
10 TABLET ORAL 3 TIMES DAILY PRN
Status: DISCONTINUED | OUTPATIENT
Start: 2023-02-20 | End: 2023-02-22 | Stop reason: HOSPADM

## 2023-02-20 RX ORDER — HYDROXYCHLOROQUINE SULFATE 200 MG/1
200 TABLET, FILM COATED ORAL EVERY 12 HOURS SCHEDULED
Status: DISCONTINUED | OUTPATIENT
Start: 2023-02-20 | End: 2023-02-22 | Stop reason: HOSPADM

## 2023-02-20 RX ORDER — BUSPIRONE HYDROCHLORIDE 5 MG/1
5 TABLET ORAL 3 TIMES DAILY
Status: DISCONTINUED | OUTPATIENT
Start: 2023-02-20 | End: 2023-02-22 | Stop reason: HOSPADM

## 2023-02-20 RX ORDER — PREGABALIN 100 MG/1
200 CAPSULE ORAL 3 TIMES DAILY
Status: DISCONTINUED | OUTPATIENT
Start: 2023-02-20 | End: 2023-02-20

## 2023-02-20 RX ORDER — ENOXAPARIN SODIUM 100 MG/ML
40 INJECTION SUBCUTANEOUS DAILY
Status: DISCONTINUED | OUTPATIENT
Start: 2023-02-20 | End: 2023-02-22 | Stop reason: HOSPADM

## 2023-02-20 RX ORDER — PREGABALIN 100 MG/1
200 CAPSULE ORAL 2 TIMES DAILY
Status: DISCONTINUED | OUTPATIENT
Start: 2023-02-21 | End: 2023-02-21

## 2023-02-20 RX ORDER — SODIUM CHLORIDE 0.9 % (FLUSH) 0.9 %
10 SYRINGE (ML) INJECTION AS NEEDED
Status: DISCONTINUED | OUTPATIENT
Start: 2023-02-20 | End: 2023-02-22 | Stop reason: HOSPADM

## 2023-02-20 RX ORDER — LIDOCAINE HYDROCHLORIDE 10 MG/ML
5 INJECTION, SOLUTION INFILTRATION; PERINEURAL ONCE
Status: DISCONTINUED | OUTPATIENT
Start: 2023-02-20 | End: 2023-02-22 | Stop reason: HOSPADM

## 2023-02-20 RX ORDER — LOSARTAN POTASSIUM 50 MG/1
25 TABLET ORAL DAILY
Status: DISCONTINUED | OUTPATIENT
Start: 2023-02-20 | End: 2023-02-22 | Stop reason: HOSPADM

## 2023-02-20 RX ORDER — ATORVASTATIN CALCIUM 10 MG/1
20 TABLET, FILM COATED ORAL DAILY
Status: DISCONTINUED | OUTPATIENT
Start: 2023-02-20 | End: 2023-02-20

## 2023-02-20 RX ADMIN — SODIUM CHLORIDE 100 ML/HR: 9 INJECTION, SOLUTION INTRAVENOUS at 16:43

## 2023-02-20 RX ADMIN — BUPROPION HYDROCHLORIDE 150 MG: 150 TABLET, FILM COATED, EXTENDED RELEASE ORAL at 18:06

## 2023-02-20 RX ADMIN — BUSPIRONE HYDROCHLORIDE 5 MG: 5 TABLET ORAL at 18:06

## 2023-02-20 RX ADMIN — PREGABALIN 200 MG: 100 CAPSULE ORAL at 18:05

## 2023-02-20 RX ADMIN — HYDROXYCHLOROQUINE SULFATE 200 MG: 200 TABLET ORAL at 21:10

## 2023-02-20 RX ADMIN — LOSARTAN POTASSIUM 25 MG: 50 TABLET, FILM COATED ORAL at 18:06

## 2023-02-20 RX ADMIN — BUSPIRONE HYDROCHLORIDE 5 MG: 5 TABLET ORAL at 21:10

## 2023-02-20 RX ADMIN — ATORVASTATIN CALCIUM 20 MG: 10 TABLET, FILM COATED ORAL at 18:06

## 2023-02-20 RX ADMIN — ENOXAPARIN SODIUM 40 MG: 100 INJECTION SUBCUTANEOUS at 18:07

## 2023-02-20 RX ADMIN — DULOXETINE HYDROCHLORIDE 120 MG: 30 CAPSULE, DELAYED RELEASE ORAL at 18:06
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
## 2023-02-20 NOTE — CONSULTS
Referring Provider: Jared Estrada MD    Reason for Consultation: recurrent syncope    Chief complaint: recurrent syncope    Subjective     History of present illness:  Peggy Regan is a 62 y.o. female with hypertension, hyperlipidemia, fibromyalgia, depression, esophageal reflux disease, Type 2 DM, obstructive sleep apnea who presents today for recurrent syncope. Patient states that she has been having difficulty with short term memory loss for awhile but it really started to get back around October. She states that she has been dealing with headaches daily as well. She had an MRI of her brain done recently at [facility] that showed some concern for an abnormality of either prior trauma or less likely tumor. Patient reports that she has started having near syncope and syncope since January. HSe had a US carotid done outpatient. She reports syncope has been worse over the past week. Patient was evaluated in [facility] ER yesterday after two episodes of syncope. The first one occurred yesterday about 10 am while FaceTiming her daughter; when the patient slumped over and was unresponsive for about 1-2 minutes. The second episode happened later while she was sitting and talking to another daughter on the phone. She reports that she was still able to hear voices. She denies any loss of bladder during those episodes. ER workup revealed stable CBC and CMP, Troponin was negative, BNP and D dimer were negative. Unremarkable Mag and TSH. She was discharged home. She reports that she woke up this am continuing to feel weak and near syncope so she called and got an appointment at [facility] today. She states that she was at home alone this am and felt faint so she laid on the couch. She thinks that she lost consciousness at that time due to when she opened her eyes the TV was not at the same point. She is unaware of how long she would have been out for. Her sister took her to the appointment today and reports that she had  a syncopal episode in office. Sister states that she was sitting on the exam table and told them she felt like she was going to pass out so they laid her back. Sister reports that she lost consciousness for approximately 30 seconds. She reports that they checked her BP in office and she was mildly orthostatic. Blood sugar was checked at was 98. Sister denies any shaking, biting of tongue or loss of bowel/bladder. Patient denies any chest pain with episodes. She denies any heart racing or palpitations. She reports that she has been nauseated for the past week and has not been eating well. She states that she has tried to keep up with drinking her fluids. Patient monitors her BP routinely and states that it has been running 130/80's. She takes losartan 50 mg daily but hasnt taken it the past two days due to feeling near syncope. She denies any dyspnea on exertion, leg swelling, orthopnea or PND. Patient has been diagnosed with obstructive sleep apnea but reports that she hasnt worn her CPAP in about 2 years.     History  Past Medical History:   Diagnosis Date   • Arthritis    • Asthma    • Chronic kidney failure     stage 3a   • Cough     multifactorial and adversely affected by shy and obesity   • Degenerative disc disease, lumbar    • Degenerative disc disease, thoracic    • Depression    • Family history of colonic polyps    • Fibromyalgia    • Functional dyspepsia    • GERD (gastroesophageal reflux disease)    • History of colon polyps    • Hypercholesteremia    • Hypertension    • Sleep apnea     CPAP   • Spinal headache    • Type 2 diabetes mellitus (HCC)    • Wears glasses    ,   Past Surgical History:   Procedure Laterality Date   • ANTERIOR CERVICAL FUSION  2016   • BRAVO PROCEDURE  2011    Borderline test while off PPI; Day 1 suggestive of reflux disease and Day 2 is negative   •  SECTION      X2   • CHOLECYSTECTOMY     • COLONOSCOPY  10/29/2015    One 5mm polyp in the sigmoid  colon-complete resection, polyp tissue not retrieved; Examination was otherwise normal on direct and retroflexion views; Repeat 5 years    • COLONOSCOPY  08/18/2003    Normal; Repeat at age 50   • COLONOSCOPY  11/01/2010    Internal hemorrhoids; One 1cm polyp in the descending colon-path shows benign submucosal leiomyoma; Repeat 3 years   • COLONOSCOPY  06/16/1992    Dr. Arredondo-Rox colonoscopy   • COLONOSCOPY N/A 4/28/2021    Procedure: COLONOSCOPY WITH ANESTHESIA;  Surgeon: Peggy Zuñiga MD;  Location: [facility] ENDOSCOPY;  Service: Gastroenterology;  Laterality: N/A;  preop; hx of polyps  postop  PCP none    • ENDOSCOPY  01/11/2011    Normal esophagus; Normal stomach; Normal examined duodenum; BRAVO capsule placed    • ENDOSCOPY  12/01/2010    Suboptimal view due to retained gastric contents; Will check SPGE scan; Will repeat endo/MAC with BRAVO replacement   • ENDOSCOPY  08/18/2003    Normal; RTC 3 months   • ENDOSCOPY  09/22/1994    Dr. Morales endoscopy; Slant taken for H.Pylori   • HYSTERECTOMY     • KNEE ARTHROSCOPY     • VENTRAL/INCISIONAL HERNIA REPAIR N/A 12/28/2016    Procedure: VENTRAL/INCISIONAL HERNIA REPAIR WITH MESH;  Surgeon: Mckenna Adair MD;  Location: [facility] OR;  Service:    ,   Family History   Problem Relation Age of Onset   • Heart disease Mother    • Colon polyps Mother         > 60 years of age    • Colon polyps Father         > 60 years of age    • Hypertension Sister    • Colon cancer Neg Hx    • Esophageal cancer Neg Hx    • Liver cancer Neg Hx    • Liver disease Neg Hx    • Rectal cancer Neg Hx    • Stomach cancer Neg Hx    ,   Social History     Tobacco Use   • Smoking status: Never   • Smokeless tobacco: Never   Vaping Use   • Vaping Use: Never used   Substance Use Topics   • Alcohol use: Not Currently   • Drug use: No   ,     Medications    Prior to Admission medications    Medication Sig Start Date End Date Taking? Authorizing Provider   aspirin 81 MG EC tablet Take 1 tablet  by mouth Daily. 2/14/23   Edward Martinez, DO   B Complex Vitamins (VITAMIN B COMPLEX PO) Take 1 capsule by mouth Daily.    Zaheer Grimaldo MD   Blood Glucose Monitoring Suppl (ACCU-CHEK LAVELL SMARTVIEW) W/DEVICE kit 3 (three) times a day.    Zaheer Grimaldo MD   buPROPion XL (WELLBUTRIN XL) 150 MG 24 hr tablet Take 150 mg by mouth Daily. 7/16/15   Zaheer Grimaldo MD   busPIRone (BUSPAR) 5 MG tablet Take 10 mg by mouth 3 (Three) Times a Day.    Emergency, Nurse Nahomy RN   cetirizine (zyrTEC) 10 MG tablet Take 10 mg by mouth Daily.    Zaheer Grimaldo MD   cyclobenzaprine (FLEXERIL) 10 MG tablet Take 10 mg by mouth 3 (Three) Times a Day As Needed for Muscle Spasms.    Nurse Nahomy Mcdonnell RN   DULoxetine (CYMBALTA) 60 MG capsule Take 120 mg by mouth Daily. 7/16/15   Zaheer Grimaldo MD   fenofibrate (TRICOR) 145 MG tablet Take 145 mg by mouth Daily.    Emergency, Nurse Nahomy RN   hydroxychloroquine (PLAQUENIL) 200 MG tablet Take 200 mg by mouth 2 (two) times a day. 9/9/20   Nurse Nahomy Mcdonnell RN   losartan (COZAAR) 25 MG tablet Take 25 mg by mouth Daily.    Zaheer Grimaldo MD   montelukast (SINGULAIR) 10 MG tablet Take 10 mg by mouth Daily.    Zaheer Grimaldo MD   pantoprazole (PROTONIX) 40 MG EC tablet Take 40 mg by mouth 2 (Two) Times a Day. 6/18/15   Zaheer Grimaldo MD   pregabalin (LYRICA) 200 MG capsule Take 200 mg by mouth 3 (Three) Times a Day. 8/6/15   Zaheer Grimaldo MD   simvastatin (ZOCOR) 40 MG tablet Take 40 mg by mouth Daily. 8/17/15   Zaheer Grimaldo MD   Topiramate (TOPAMAX PO) Take 1 tablet by mouth 3 (Three) Times a Day.    Zaheer Grimaldo MD   traMADol (ULTRAM) 50 MG tablet Take 50 mg by mouth Every 6 (Six) Hours As Needed for Moderate Pain .    Nurse Nahomy Mcdonnell RN   traZODone (DESYREL) 100 MG tablet Take 200 mg by mouth Every Night.    Zaheer Grimaldo MD   vitamin D (ERGOCALCIFEROL) 1.25 MG (20316 UT) capsule  capsule Take 50,000 Units by mouth 1 (One) Time Per Week.    Provider, MD Zaheer       Current Facility-Administered Medications   Medication Dose Route Frequency Provider Last Rate Last Admin   • atorvastatin (LIPITOR) tablet 20 mg  20 mg Oral Daily Jared Estrada MD       • buPROPion XL (WELLBUTRIN XL) 24 hr tablet 150 mg  150 mg Oral Daily Jared Estrada MD       • busPIRone (BUSPAR) tablet 5 mg  5 mg Oral TID Jared Estrada MD       • cyclobenzaprine (FLEXERIL) tablet 10 mg  10 mg Oral TID PRN Jared Estrada MD       • DULoxetine (CYMBALTA) DR capsule 120 mg  120 mg Oral Daily Jared Estrada MD       • Enoxaparin Sodium (LOVENOX) syringe 40 mg  40 mg Subcutaneous Daily Jared Estrada MD       • hydroxychloroquine (PLAQUENIL) tablet 200 mg  200 mg Oral Q12H Jared Estrada MD       • losartan (COZAAR) tablet 25 mg  25 mg Oral Daily Jared Estrada MD       • [START ON 2/21/2023] pantoprazole (PROTONIX) EC tablet 40 mg  40 mg Oral Q AM Jared Estrada MD       • pregabalin (LYRICA) capsule 200 mg  200 mg Oral TID Jared Estrada MD       • sodium chloride 0.9 % flush 10 mL  10 mL Intravenous Q12H Jared Estrada MD       • sodium chloride 0.9 % flush 10 mL  10 mL Intravenous PRN Jared Estrada MD       • sodium chloride 0.9 % infusion 40 mL  40 mL Intravenous PRN Jared Estrada MD       • sodium chloride 0.9 % infusion  100 mL/hr Intravenous Continuous Jared Estrada MD       • traMADol (ULTRAM) tablet 50 mg  50 mg Oral Q6H PRN Jared Estrada MD           Allergies:  Tetracyclines & related, Erythromycin, and Penicillins    Review of Systems  Review of Systems   Cardiovascular: Positive for near-syncope and syncope. Negative for chest pain, dyspnea on exertion, irregular heartbeat, leg swelling, orthopnea, palpitations and paroxysmal nocturnal dyspnea.   Neurological: Positive for headaches.   Psychiatric/Behavioral: Positive  for memory loss.   All other systems reviewed and are negative.      Objective     Physical Exam:  No data found.  Vitals reviewed.   Constitutional:       General: Not in acute distress.     Appearance: Normal appearance. Well-developed. Obese.   Eyes:      Pupils: Pupils are equal, round, and reactive to light.   HENT:      Head: Normocephalic and atraumatic.      Nose: Nose normal.   Neck:      Vascular: No carotid bruit.   Pulmonary:      Effort: Pulmonary effort is normal. No respiratory distress.      Breath sounds: Normal breath sounds. No wheezing. No rales.   Cardiovascular:      Normal rate. Regular rhythm.      Murmurs: There is no murmur.   Edema:     Peripheral edema absent.   Abdominal:      General: There is no distension.      Palpations: Abdomen is soft.   Musculoskeletal: Normal range of motion.      Cervical back: Normal range of motion and neck supple. Skin:     General: Skin is warm.      Findings: No erythema or rash.   Neurological:      General: No focal deficit present.      Mental Status: Alert and oriented to person, place, and time.   Psychiatric:         Attention and Perception: Attention normal.         Mood and Affect: Mood normal.         Speech: Speech normal.         Behavior: Behavior normal.         Thought Content: Thought content normal.         Judgment: Judgment normal.         Results Review:   I reviewed the patient's new clinical results.    Lab Results (last 72 hours)     ** No results found for the last 72 hours. **          No results found for: ECHOEFEST    Imaging Results (Last 72 Hours)     ** No results found for the last 72 hours. **        Carotid Duplex 1/18/2023  Impression:  1. There is less than 50% stenosis of the right internal carotid artery.  2. There is 50-69% stenosis of the left internal carotid artery.  3. Antegrade flow is demonstrated in bilateral vertebral arteries.    Assessment & Plan     -Recurrent Syncope     -Hypertension: Patient reports was  controlled at home. Will check orthostatic BPs    -Hyperlipidemia: stable     -Type 2 DM: stable     - Obstructive sleep apnea: patient reports not using her CPAP in approximately 2 years    - Obesity     Plan:   - will order Echo   - Will order Telemetry   - get orthostatic blood pressures    Further orders per Dr Anderson    Thank you for asking us to follow this patient with you.     Please note this cardiology consultation note is the result of a face to face consultation with the patient, in addition to reviewing medical records at length by myself, JEREMÍAS Stovall.    Time: 60 minutes    Electronically signed by JEREMÍAS Stovall, 02/20/23, 3:24 PM CST.

## 2023-02-20 NOTE — PROGRESS NOTES
Brief admission note:  62-year-old female who is seen today at the [facility] by Tiesha Weeks NP for recurrent syncope.  I discussed case with her at length.  Patient has had recurrent episodes of syncope with extensive outpatient work-up including carotid duplex and MRI of her brain.  MRI brain did raise some concern for an abnormality of either prior trauma or less likely tumor.  Patient had recurrent syncopal episode in the office today and has had multiple episodes of syncope at home.  She was mildly orthostatic in the office.  Will admit for IV fluids, cardiology, neurology evaluations.  May warrant tilt table testing given episodes of recurrent syncope.  For details following full H&P in the morning    Disposition: Observation    Jared Estrada MD

## 2023-02-21 ENCOUNTER — APPOINTMENT (OUTPATIENT)
Dept: CARDIOLOGY | Facility: HOSPITAL | Age: 63
End: 2023-02-21
Payer: COMMERCIAL

## 2023-02-21 PROBLEM — R55 RECURRENT SYNCOPE: Status: ACTIVE | Noted: 2023-02-21

## 2023-02-21 LAB
BH CV ECHO MEAS - AO MAX PG: 7.8 MMHG
BH CV ECHO MEAS - AO MEAN PG: 4 MMHG
BH CV ECHO MEAS - AO ROOT DIAM: 2.7 CM
BH CV ECHO MEAS - AO V2 MAX: 140 CM/SEC
BH CV ECHO MEAS - AO V2 VTI: 30.4 CM
BH CV ECHO MEAS - AVA(I,D): 2.5 CM2
BH CV ECHO MEAS - EDV(CUBED): 55.3 ML
BH CV ECHO MEAS - EDV(MOD-SP4): 92.6 ML
BH CV ECHO MEAS - EF(MOD-SP4): 68.9 %
BH CV ECHO MEAS - ESV(CUBED): 19.5 ML
BH CV ECHO MEAS - ESV(MOD-SP4): 28.8 ML
BH CV ECHO MEAS - FS: 29.4 %
BH CV ECHO MEAS - IVS/LVPW: 1.01 CM
BH CV ECHO MEAS - IVSD: 1.13 CM
BH CV ECHO MEAS - LA DIMENSION: 3 CM
BH CV ECHO MEAS - LAT PEAK E' VEL: 8.7 CM/SEC
BH CV ECHO MEAS - LV MASS(C)D: 139.7 GRAMS
BH CV ECHO MEAS - LV MAX PG: 7 MMHG
BH CV ECHO MEAS - LV MEAN PG: 3 MMHG
BH CV ECHO MEAS - LV V1 MAX: 132 CM/SEC
BH CV ECHO MEAS - LV V1 VTI: 27 CM
BH CV ECHO MEAS - LVIDD: 3.8 CM
BH CV ECHO MEAS - LVIDS: 2.7 CM
BH CV ECHO MEAS - LVOT AREA: 2.8 CM2
BH CV ECHO MEAS - LVOT DIAM: 1.9 CM
BH CV ECHO MEAS - LVPWD: 1.12 CM
BH CV ECHO MEAS - MED PEAK E' VEL: 7.6 CM/SEC
BH CV ECHO MEAS - MV A MAX VEL: 72.8 CM/SEC
BH CV ECHO MEAS - MV DEC SLOPE: 274 CM/SEC2
BH CV ECHO MEAS - MV DEC TIME: 0.29 MSEC
BH CV ECHO MEAS - MV E MAX VEL: 83.9 CM/SEC
BH CV ECHO MEAS - MV E/A: 1.15
BH CV ECHO MEAS - MV MAX PG: 4.2 MMHG
BH CV ECHO MEAS - MV MEAN PG: 1 MMHG
BH CV ECHO MEAS - MV P1/2T: 94.3 MSEC
BH CV ECHO MEAS - MV V2 VTI: 27.1 CM
BH CV ECHO MEAS - MVA(P1/2T): 2.33 CM2
BH CV ECHO MEAS - MVA(VTI): 2.8 CM2
BH CV ECHO MEAS - PA V2 MAX: 131 CM/SEC
BH CV ECHO MEAS - RAP SYSTOLE: 5 MMHG
BH CV ECHO MEAS - RVSP: 35 MMHG
BH CV ECHO MEAS - SV(LVOT): 76.6 ML
BH CV ECHO MEAS - SV(MOD-SP4): 63.8 ML
BH CV ECHO MEAS - TAPSE (>1.6): 2.33 CM
BH CV ECHO MEAS - TR MAX PG: 30 MMHG
BH CV ECHO MEAS - TR MAX VEL: 274 CM/SEC
BH CV ECHO MEASUREMENTS AVERAGE E/E' RATIO: 10.29
BH CV VAS BP LEFT ARM: NORMAL MMHG
BH CV XLRA - TDI S': 9.8 CM/SEC
GLUCOSE BLDC GLUCOMTR-MCNC: 129 MG/DL (ref 70–130)
GLUCOSE BLDC GLUCOMTR-MCNC: 99 MG/DL (ref 70–130)
LEFT ATRIUM VOLUME INDEX: 15.6 ML/M2
LEFT ATRIUM VOLUME: 31.8 ML
MAXIMAL PREDICTED HEART RATE: 158 BPM
QT INTERVAL: 428 MS
QTC INTERVAL: 445 MS
STRESS TARGET HR: 134 BPM

## 2023-02-21 PROCEDURE — 99204 OFFICE O/P NEW MOD 45 MIN: CPT | Performed by: INTERNAL MEDICINE

## 2023-02-21 PROCEDURE — G0378 HOSPITAL OBSERVATION PER HR: HCPCS

## 2023-02-21 PROCEDURE — 96372 THER/PROPH/DIAG INJ SC/IM: CPT

## 2023-02-21 PROCEDURE — 96361 HYDRATE IV INFUSION ADD-ON: CPT

## 2023-02-21 PROCEDURE — 82962 GLUCOSE BLOOD TEST: CPT

## 2023-02-21 PROCEDURE — 99214 OFFICE O/P EST MOD 30 MIN: CPT | Performed by: PSYCHIATRY & NEUROLOGY

## 2023-02-21 PROCEDURE — 25510000001 PERFLUTREN 6.52 MG/ML SUSPENSION: Performed by: FAMILY MEDICINE

## 2023-02-21 PROCEDURE — 25010000002 ENOXAPARIN PER 10 MG: Performed by: FAMILY MEDICINE

## 2023-02-21 PROCEDURE — 25010000002 PERFLUTREN 6.52 MG/ML SUSPENSION: Performed by: FAMILY MEDICINE

## 2023-02-21 PROCEDURE — 93306 TTE W/DOPPLER COMPLETE: CPT

## 2023-02-21 PROCEDURE — 93306 TTE W/DOPPLER COMPLETE: CPT | Performed by: INTERNAL MEDICINE

## 2023-02-21 RX ORDER — DEXTROSE MONOHYDRATE 25 G/50ML
25 INJECTION, SOLUTION INTRAVENOUS
Status: DISCONTINUED | OUTPATIENT
Start: 2023-02-21 | End: 2023-02-22 | Stop reason: HOSPADM

## 2023-02-21 RX ORDER — PREGABALIN 100 MG/1
100 CAPSULE ORAL 2 TIMES DAILY
Status: DISCONTINUED | OUTPATIENT
Start: 2023-02-21 | End: 2023-02-22 | Stop reason: HOSPADM

## 2023-02-21 RX ORDER — TOPIRAMATE 25 MG/1
25 TABLET ORAL 2 TIMES DAILY
Status: DISCONTINUED | OUTPATIENT
Start: 2023-02-21 | End: 2023-02-22 | Stop reason: HOSPADM

## 2023-02-21 RX ORDER — ATORVASTATIN CALCIUM 40 MG/1
40 TABLET, FILM COATED ORAL NIGHTLY
Status: DISCONTINUED | OUTPATIENT
Start: 2023-02-21 | End: 2023-02-22 | Stop reason: HOSPADM

## 2023-02-21 RX ORDER — INSULIN LISPRO 100 [IU]/ML
2-7 INJECTION, SOLUTION INTRAVENOUS; SUBCUTANEOUS
Status: DISCONTINUED | OUTPATIENT
Start: 2023-02-21 | End: 2023-02-22 | Stop reason: HOSPADM

## 2023-02-21 RX ORDER — NICOTINE POLACRILEX 4 MG
15 LOZENGE BUCCAL
Status: DISCONTINUED | OUTPATIENT
Start: 2023-02-21 | End: 2023-02-22 | Stop reason: HOSPADM

## 2023-02-21 RX ADMIN — ATORVASTATIN CALCIUM 40 MG: 40 TABLET, FILM COATED ORAL at 20:47

## 2023-02-21 RX ADMIN — DULOXETINE HYDROCHLORIDE 120 MG: 30 CAPSULE, DELAYED RELEASE ORAL at 09:31

## 2023-02-21 RX ADMIN — PREGABALIN 100 MG: 100 CAPSULE ORAL at 09:30

## 2023-02-21 RX ADMIN — BUSPIRONE HYDROCHLORIDE 5 MG: 5 TABLET ORAL at 20:47

## 2023-02-21 RX ADMIN — SODIUM CHLORIDE 100 ML/HR: 9 INJECTION, SOLUTION INTRAVENOUS at 05:03

## 2023-02-21 RX ADMIN — PERFLUTREN 13.04 MG: 6.52 INJECTION, SUSPENSION INTRAVENOUS at 07:48

## 2023-02-21 RX ADMIN — TOPIRAMATE 25 MG: 25 TABLET, FILM COATED ORAL at 20:47

## 2023-02-21 RX ADMIN — HYDROXYCHLOROQUINE SULFATE 200 MG: 200 TABLET ORAL at 20:47

## 2023-02-21 RX ADMIN — LOSARTAN POTASSIUM 25 MG: 50 TABLET, FILM COATED ORAL at 09:30

## 2023-02-21 RX ADMIN — BUPROPION HYDROCHLORIDE 150 MG: 150 TABLET, FILM COATED, EXTENDED RELEASE ORAL at 09:30

## 2023-02-21 RX ADMIN — Medication 10 ML: at 20:48

## 2023-02-21 RX ADMIN — BUSPIRONE HYDROCHLORIDE 5 MG: 5 TABLET ORAL at 09:31

## 2023-02-21 RX ADMIN — SODIUM CHLORIDE 100 ML/HR: 9 INJECTION, SOLUTION INTRAVENOUS at 14:07

## 2023-02-21 RX ADMIN — PREGABALIN 100 MG: 100 CAPSULE ORAL at 20:47

## 2023-02-21 RX ADMIN — HYDROXYCHLOROQUINE SULFATE 200 MG: 200 TABLET ORAL at 09:30

## 2023-02-21 RX ADMIN — ENOXAPARIN SODIUM 40 MG: 100 INJECTION SUBCUTANEOUS at 10:44

## 2023-02-21 RX ADMIN — PANTOPRAZOLE SODIUM 40 MG: 40 TABLET, DELAYED RELEASE ORAL at 06:42

## 2023-02-21 RX ADMIN — Medication 10 ML: at 09:31

## 2023-02-21 RX ADMIN — TOPIRAMATE 25 MG: 25 TABLET, FILM COATED ORAL at 10:44

## 2023-02-21 RX ADMIN — BUSPIRONE HYDROCHLORIDE 5 MG: 5 TABLET ORAL at 16:45

## 2023-02-21 NOTE — PROGRESS NOTES
[facility] -  Progress Note     LOS: 0 days   Patient Care Team:  Radha Pepe APRN as PCP - General (Family Medicine)  Gabe Mansfield MD as Referring Physician (Family Medicine)  Peggy Zuñiga MD as Consulting Physician (Gastroenterology)    Chief Complaint: syncope    Subjective     Interval History:     Patient Complaints: patient is lying in bed comfortably with family at bedside this am. She reports that she had difficulty sleeping last night due to not getting her sleeping pills. She reports that she got dizzy when they were sitting her up to take her BP this am. BP was mildly orthostatic this am. Patient is receiving fluids. She denies any heart racing or palpitations. She denies any leg swelling, orthopnea or PND.     Review of Systems:     Review of Systems   Cardiovascular: Negative for chest pain, palpitations and leg swelling.   Neurological: Positive for dizziness.   All other systems reviewed and are negative.    Objective     Vital Sign Min/Max for last 24 hours  Temp  Min: 98.2 °F (36.8 °C)  Max: 98.6 °F (37 °C)   BP  Min: 106/79  Max: 152/75   Pulse  Min: 67  Max: 90   Resp  Min: 16  Max: 18   SpO2  Min: 96 %  Max: 98 %   No data recorded   Weight  Min: 87.3 kg (192 lb 8 oz)  Max: 87.3 kg (192 lb 8 oz)         02/20/23  1500   Weight: 87.3 kg (192 lb 8 oz)       Telemetry: sinus rhythm rate 69-94 through the night    Physical Exam:    Vitals reviewed.   Constitutional:       General: Not in acute distress.     Appearance: Normal appearance. Well-developed. Obese.   Eyes:      Pupils: Pupils are equal, round, and reactive to light.   HENT:      Head: Normocephalic and atraumatic.      Nose: Nose normal.   Neck:      Vascular: No carotid bruit.   Pulmonary:      Effort: Pulmonary effort is normal. No respiratory distress.      Breath sounds: Normal breath sounds. No wheezing. No rales.   Cardiovascular:      Normal rate. Regular rhythm.      Murmurs: There is no  murmur.   Edema:     Peripheral edema absent.   Abdominal:      General: There is no distension.      Palpations: Abdomen is soft.   Musculoskeletal: Normal range of motion.      Cervical back: Normal range of motion and neck supple. Skin:     General: Skin is warm.      Findings: No erythema or rash.   Neurological:      General: No focal deficit present.      Mental Status: Alert and oriented to person, place, and time.   Psychiatric:         Attention and Perception: Attention normal.         Mood and Affect: Mood normal.         Speech: Speech normal.         Behavior: Behavior normal.         Thought Content: Thought content normal.         Judgment: Judgment normal.       Results Review:   Lab Results (last 72 hours)     Procedure Component Value Units Date/Time    Lipid Panel [[id number]]  (Abnormal) Collected: 02/20/23 1811    Specimen: Blood Updated: 02/20/23 1838     Total Cholesterol 203 mg/dL      Triglycerides 118 mg/dL      HDL Cholesterol 63 mg/dL      LDL Cholesterol  119 mg/dL      VLDL Cholesterol 21 mg/dL      LDL/HDL Ratio 1.85    Narrative:      Cholesterol Reference Ranges  (U.S. Department of Health and Human Services ATP III Classifications)    Desirable          <200 mg/dL  Borderline High    200-239 mg/dL  High Risk          >240 mg/dL      Triglyceride Reference Ranges  (U.S. Department of Health and Human Services ATP III Classifications)    Normal           <150 mg/dL  Borderline High  150-199 mg/dL  High             200-499 mg/dL  Very High        >500 mg/dL    HDL Reference Ranges  (U.S. Department of Health and Human Services ATP III Classifications)    Low     <40 mg/dl (major risk factor for CHD)  High    >60 mg/dl ('negative' risk factor for CHD)        LDL Reference Ranges  (U.S. Department of Health and Human Services ATP III Classifications)    Optimal          <100 mg/dL  Near Optimal     100-129 mg/dL  Borderline High  130-159 mg/dL  High             160-189 mg/dL  Very High         >189 mg/dL    Hemoglobin A1c [[id number]]  (Normal) Collected: 02/19/23 1756    Specimen: Blood Updated: 02/20/23 1733     Hemoglobin A1C 5.10 %     Narrative:      Hemoglobin A1C Ranges:    Increased Risk for Diabetes  5.7% to 6.4%  Diabetes                     >= 6.5%  Diabetic Goal                < 7.0%              Echo EF Estimated  No results found for: ECHOEFEST      Cath Ejection Fraction Quantitative  No results found for: CATHEF        Medication Review: yes  Current Facility-Administered Medications   Medication Dose Route Frequency Provider Last Rate Last Admin   • atorvastatin (LIPITOR) tablet 20 mg  20 mg Oral Nightly Jared Estrada MD       • buPROPion XL (WELLBUTRIN XL) 24 hr tablet 150 mg  150 mg Oral Daily Jared Estrada MD   150 mg at 02/20/23 1806   • busPIRone (BUSPAR) tablet 5 mg  5 mg Oral TID Jared Estrada MD   5 mg at 02/20/23 2110   • cyclobenzaprine (FLEXERIL) tablet 10 mg  10 mg Oral TID PRN Jared Estrada MD       • dextrose (D50W) (25 g/50 mL) IV injection 25 g  25 g Intravenous Q15 Min PRN Jared Estrada MD       • dextrose (GLUTOSE) oral gel 15 g  15 g Oral Q15 Min PRN Jared Estrada MD       • DULoxetine (CYMBALTA) DR capsule 120 mg  120 mg Oral Daily Jared Estrada MD   120 mg at 02/20/23 1806   • Enoxaparin Sodium (LOVENOX) syringe 40 mg  40 mg Subcutaneous Daily Jared Estrada MD   40 mg at 02/20/23 1807   • glucagon (human recombinant) (GLUCAGEN DIAGNOSTIC) injection 1 mg  1 mg Intramuscular Q15 Min PRN Jared Estrada MD       • hydroxychloroquine (PLAQUENIL) tablet 200 mg  200 mg Oral Q12H Jared Estrada MD   200 mg at 02/20/23 2110   • Insulin Lispro (humaLOG) injection 2-7 Units  2-7 Units Subcutaneous TID AC Jared Estrada MD       • lidocaine (XYLOCAINE) 1 % injection 5 mL  5 mL Infiltration Once Ramakrishna Rueda MD       • losartan (COZAAR) tablet 25 mg  25 mg Oral Daily Jared Estrada MD   25 mg  at 02/20/23 1806   • pantoprazole (PROTONIX) EC tablet 40 mg  40 mg Oral Q AM Jared Estrada MD   40 mg at 02/21/23 0642   • pregabalin (LYRICA) capsule 200 mg  200 mg Oral BID Jared Estrada MD       • sodium chloride 0.9 % flush 10 mL  10 mL Intravenous Q12H Jared Estrada MD       • sodium chloride 0.9 % flush 10 mL  10 mL Intravenous PRN Jared Estrada MD       • sodium chloride 0.9 % infusion 40 mL  40 mL Intravenous PRN Jared Estrada MD       • sodium chloride 0.9 % infusion  100 mL/hr Intravenous Continuous Jared Estrada  mL/hr at 02/21/23 0503 100 mL/hr at 02/21/23 0503   • triamcinolone acetonide (KENALOG-40) injection 40 mg  40 mg Intramuscular Once Ramakrishna Rueda MD           Assessment & Plan     -Recurrent Syncope      -Hypertension: Patient reports was controlled at home.      -Hyperlipidemia: Lipid panel demonstrates elevated total cholesterol and LDL. Increase atorvastatin to 40 mg     -Type 2 DM: stable      - Obstructive sleep apnea: patient reports not using her CPAP in approximately 2 years     - Obesity      Plan:   - Echo is pending  - continue to monitor Telemetry   - get orthostatic blood pressures  - Holter monitor on discharge could be considered for further evaluation      Time: 25 minutes    Electronically signed by JEREMÍAS Stovall, 02/21/23, 8:34 AM CST.
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
## 2023-02-21 NOTE — PLAN OF CARE
Goal Outcome Evaluation:  Plan of Care Reviewed With: patient, sibling        Progress: no change  Outcome Evaluation: Pt A&Ox4,  room air. Denies n/t. denies pain. SAUER, up standby asst. IV started, IVF. EKG completed per order. LBM 2/20. lovenox, VTE. orthos completed. regular diet. Call light within reach. Safety maintained. Sister and  at bedside.

## 2023-02-21 NOTE — PROGRESS NOTES
Neurology Progress Note      Chief Complaint:  F/u syncope  Length of Stay:  0   Subjective     Subjective:  Patient sitting on edge of bed. She had just finished eating. Daughter and sister at bedside.  is getting MRI disc this AM. She reports when orthostatic BP done this AM, her symptoms reproduced without syncope. She sat back down immediately. There was 25 point drop in SBP. She reports she had seen pain management in the past and received injections in cervical spine and occipital region and this did relieve her HAs. We are planning on bilateral occipital nerve block this AM and I have discussed with Kaushal YOUNG.   Patient home med list includes Topamax 25 mg TID. Will begin slow taper.             A1C 5.4  TSH 2.77  T4 0.82    Medications:  Current Facility-Administered Medications   Medication Dose Route Frequency Provider Last Rate Last Admin   • atorvastatin (LIPITOR) tablet 40 mg  40 mg Oral Nightly Miguel Barnett APRN       • buPROPion XL (WELLBUTRIN XL) 24 hr tablet 150 mg  150 mg Oral Daily Jared Estrada MD   150 mg at 02/20/23 1806   • busPIRone (BUSPAR) tablet 5 mg  5 mg Oral TID Jared Estrada MD   5 mg at 02/20/23 2110   • cyclobenzaprine (FLEXERIL) tablet 10 mg  10 mg Oral TID PRN Jared Estrada MD       • dextrose (D50W) (25 g/50 mL) IV injection 25 g  25 g Intravenous Q15 Min PRN Jared Estrada MD       • dextrose (GLUTOSE) oral gel 15 g  15 g Oral Q15 Min PRN Jared Estrada MD       • DULoxetine (CYMBALTA) DR capsule 120 mg  120 mg Oral Daily Jared Estrada MD   120 mg at 02/20/23 1806   • Enoxaparin Sodium (LOVENOX) syringe 40 mg  40 mg Subcutaneous Daily Jared Estrada MD   40 mg at 02/20/23 1807   • glucagon (human recombinant) (GLUCAGEN DIAGNOSTIC) injection 1 mg  1 mg Intramuscular Q15 Min PRN Jared Estrada MD       • hydroxychloroquine (PLAQUENIL) tablet 200 mg  200 mg Oral Q12H Jared Estrada MD   200 mg at 02/20/23  2110   • Insulin Lispro (humaLOG) injection 2-7 Units  2-7 Units Subcutaneous TID AC Jared Estrada MD       • lidocaine (XYLOCAINE) 1 % injection 5 mL  5 mL Infiltration Once Ramakrishna Rueda MD       • losartan (COZAAR) tablet 25 mg  25 mg Oral Daily Jared Estrada MD   25 mg at 02/20/23 1806   • pantoprazole (PROTONIX) EC tablet 40 mg  40 mg Oral Q AM Jared Estrada MD   40 mg at 02/21/23 0642   • pregabalin (LYRICA) capsule 200 mg  200 mg Oral BID Jared Estrada MD       • sodium chloride 0.9 % flush 10 mL  10 mL Intravenous Q12H Jared Estrada MD       • sodium chloride 0.9 % flush 10 mL  10 mL Intravenous PRN Jared Estrada MD       • sodium chloride 0.9 % infusion 40 mL  40 mL Intravenous PRN Jared Estrada MD       • sodium chloride 0.9 % infusion  100 mL/hr Intravenous Continuous Jared Estrada  mL/hr at 02/21/23 0503 100 mL/hr at 02/21/23 0503   • triamcinolone acetonide (KENALOG-40) injection 40 mg  40 mg Intramuscular Once Ramakrishna Rueda MD           Current Facility-Administered Medications:   •  atorvastatin (LIPITOR) tablet 40 mg, 40 mg, Oral, Nightly, Miguel Barnett, JEREMÍAS  •  buPROPion XL (WELLBUTRIN XL) 24 hr tablet 150 mg, 150 mg, Oral, Daily, Jared Estrada MD, 150 mg at 02/20/23 1806  •  busPIRone (BUSPAR) tablet 5 mg, 5 mg, Oral, TID, Jared Estrada MD, 5 mg at 02/20/23 2110  •  cyclobenzaprine (FLEXERIL) tablet 10 mg, 10 mg, Oral, TID PRN, Jared Estrada MD  •  dextrose (D50W) (25 g/50 mL) IV injection 25 g, 25 g, Intravenous, Q15 Min PRN, Jared Estrada MD  •  dextrose (GLUTOSE) oral gel 15 g, 15 g, Oral, Q15 Min PRN, Jared Estrada MD  •  DULoxetine (CYMBALTA) DR capsule 120 mg, 120 mg, Oral, Daily, Jared Estrada MD, 120 mg at 02/20/23 1806  •  Enoxaparin Sodium (LOVENOX) syringe 40 mg, 40 mg, Subcutaneous, Daily, Jared Estrada MD, 40 mg at 02/20/23 1807  •  glucagon (human recombinant)  (GLUCAGEN DIAGNOSTIC) injection 1 mg, 1 mg, Intramuscular, Q15 Min PRN, Jared Estrada MD  •  hydroxychloroquine (PLAQUENIL) tablet 200 mg, 200 mg, Oral, Q12H, Jared Estrada MD, 200 mg at 02/20/23 2110  •  Insulin Lispro (humaLOG) injection 2-7 Units, 2-7 Units, Subcutaneous, TID AC, Jared Estrada MD  •  lidocaine (XYLOCAINE) 1 % injection 5 mL, 5 mL, Infiltration, Once, Ramakrishna Rueda MD  •  losartan (COZAAR) tablet 25 mg, 25 mg, Oral, Daily, Jared Estrada MD, 25 mg at 02/20/23 1806  •  pantoprazole (PROTONIX) EC tablet 40 mg, 40 mg, Oral, Q AM, Jared Estrada MD, 40 mg at 02/21/23 0642  •  pregabalin (LYRICA) capsule 200 mg, 200 mg, Oral, BID, Jared Estrada MD  •  sodium chloride 0.9 % flush 10 mL, 10 mL, Intravenous, Q12H, Jared Estrada MD  •  sodium chloride 0.9 % flush 10 mL, 10 mL, Intravenous, PRN, Jared Estrada MD  •  sodium chloride 0.9 % infusion 40 mL, 40 mL, Intravenous, PRN, Jared Estrada MD  •  sodium chloride 0.9 % infusion, 100 mL/hr, Intravenous, Continuous, Jared Estrada MD, Last Rate: 100 mL/hr at 02/21/23 0503, 100 mL/hr at 02/21/23 0503  •  triamcinolone acetonide (KENALOG-40) injection 40 mg, 40 mg, Intramuscular, Once, Ramakrishna Rueda MD        Objective      Vital Signs  Temp:  [98.2 °F (36.8 °C)-98.6 °F (37 °C)] 98.3 °F (36.8 °C)  Heart Rate:  [67-90] 84  Resp:  [16-18] 18  BP: (106-152)/(58-84) 106/79    General Exam:  Head:  Normocephalic, atraumatic  HEENT:  Neck supple  Fundoscopic Exam:  No signs of disc edema  CVS:  Regular rate and rhythm.  No murmurs  Carotid Examination:  No bruits  Lungs:  Clear to auscultation  Abdomen:  Nontender, Nondistended  Extremities:  No signs of peripheral edema  Skin:  No rashes        Neurologic Exam:     Mental Status:    -Alert, Oriented X 3  -No word-finding difficulties  -No aphasia  -No dysarthria  -Follows simple and complex commands  Patient able to count backward from 100  by 7  Delay recall 3/3 words intact     Palpation bilateral occipital region is able to reproduce patient occipital HA.        CN II:  Visual fields full.  Pupils equally reactive to light  CN III, IV, VI:  Extraocular Muscles with history of left lazy eye and left eye abducts. no signs of nystagmus.   CN V:  Facial sensory is symmetric with no asymmetries.  CN VII:  Facial motor symmetric  CN VIII:  Gross hearing intact bilaterally  CN IX:  Palate elevates symmetrically  CN X:  Palate elevates symmetrically  CN XI:  Shoulder shrug symmetric  CN XII:  Tongue protrudes to midline  Motor: (strength out of 5:  1= minimal movement, 2 = movement in plane of gravity, 3 = movement against gravity, 4 = movement against some resistance, 5 = full strength)     -Right Upper Ext: Proximal: 5 Distal: 5  -Left Upper Ext: Proximal: 5   Distal: 5     -Right Lower Ext: Proximal: 5 Distal: 5  -Left Lower Ext: Proximal: 5   Distal: 5     DTR:  -Right              Bicep: 2+         Tricep: 2+        Brachoradialis: 2+              Patella: 2+       Ankle: 2+         Neg Babinski  -Left              Bicep: 2+         Tricep: 2+        Brachoradialis: 2+              Patella: 2+       Ankle: 2+         Neg Babinski     Sensory:  -Intact to light touch, pinprick, temperature, pain, and proprioception     Coordination/gait:  -Finger to nose intact  -Heel to shin intact                Results Review:    No radiology results for the last 7 days    Assessment/Plan     Hospital Problem List      Recurrent syncope    Essential hypertension    Fibromyalgia    Depression    GERD (gastroesophageal reflux disease)    ANGELLA (obstructive sleep apnea)    Impression:  1. Syncope. Orthostatic hypotension. Patient symptoms reproduced when orthostatic BP tested this AM without syncope as she had to quickly sit back down. P  2. Bilateral occipital neuralgia  3. Memory loss. At this point this may be related to Topamax which is known to cause memory loss.  4.  ANGELLA not using CPAP for at least 2 years.   5. Abnormal finding on MRI brain done at [facility].      Plan:  1. Family to bring CD of MRI brain done at [facility].  2. Will begin Topamax taper. Topamax 25 mg TID is listed.   Taper:  Topamax 25 mg BID for 5 days, then 25 mg at HS for 5 days and then discontinue. I expect cognitive function to improve once Topamax has been discontinued. If not can address cognitive impairment as outpatient.   3. Plan for bilateral occipital nerve block this AM. Discussed with Kaushal YOUNG to get meds and supplies as previously ordered.  4. Counseled patient to avoid sudden position changes and prolonged standing. Remain well hydrated. Take warm, not hot showers. Wear compression socks.  5. Attending is adjusting medications as I suspect polypharmacy is playing a role.  6. Cardiology work up includes TTE and cardiac monitoring and orthostatic BP.  7. Will recommend patient resume CPAP use and she is in the process of getting a new machine      Medical Decision Making    Number/Complexity of Problems  • Moderate  o 1 undiagnosed new problem with uncertain prognosis -   o 1 acute illness with systemic symptoms -   • High  o 1 acute or chronic illness that pose a threat to life/body function -   high  Recurrent syncope- acute/chronic illness that pose a threat to life/body function      MDM Data  • Moderate - 1/3 categories  • Extensive - 2/3 categories    • Category 1: 3 of the following  o Review of external notes  o Review of results  o Ordering of each unique test  o Independent historian  • Category 2:  Independent interpretation of test (ex: imaging)  • Category 3:  Discussion of management with another provider    I have personaly reviewed labs and reviewed notes of attending and cardiology  Discussed with nursing       Treatment Plan  • Moderate - Prescription Drug management  • High  o Drug therapy requiring intensive monitoring for toxicity  o Decision regarding hospitalization or  escalation of care  o De-escalate care/DNR decisions  High  Plan for bilateral occipital nerve block this AM.   topamax taper        Amber Rae, APRN  02/21/23  08:47 CST

## 2023-02-21 NOTE — PLAN OF CARE
Goal Outcome Evaluation:  Plan of Care Reviewed With: patient, spouse            Patient is alert and oriented x 4. VSS. RA. Mild dizziness while up. Resting in bed most of the day. Denies headache post occipital nerve block by neurologist. Denies dizziness.  Tolerating well. IVF continue as ordered. Voids in BR. LBM yesterday. Eating and drinking well. Safety precautions maintained. Lovenox for VTE. Accu check , insulin per sliding scale. Safety precautions maintained. Family at bedside. Will continue to monitor.

## 2023-02-22 ENCOUNTER — HOSPITAL ENCOUNTER (OUTPATIENT)
Dept: CARDIOLOGY | Facility: HOSPITAL | Age: 63
Setting detail: OBSERVATION
Discharge: HOME | End: 2023-02-22
Payer: COMMERCIAL

## 2023-02-22 VITALS
BODY MASS INDEX: 32.07 KG/M2 | WEIGHT: 192.5 LBS | DIASTOLIC BLOOD PRESSURE: 62 MMHG | HEART RATE: 73 BPM | HEIGHT: 65 IN | SYSTOLIC BLOOD PRESSURE: 115 MMHG | RESPIRATION RATE: 18 BRPM | TEMPERATURE: 98.5 F | OXYGEN SATURATION: 96 %

## 2023-02-22 DIAGNOSIS — R55 SYNCOPE, UNSPECIFIED SYNCOPE TYPE: ICD-10-CM

## 2023-02-22 LAB
GLUCOSE BLDC GLUCOMTR-MCNC: 117 MG/DL (ref 70–130)
GLUCOSE BLDC GLUCOMTR-MCNC: 90 MG/DL (ref 70–130)

## 2023-02-22 PROCEDURE — 25010000002 ENOXAPARIN PER 10 MG: Performed by: FAMILY MEDICINE

## 2023-02-22 PROCEDURE — 82962 GLUCOSE BLOOD TEST: CPT

## 2023-02-22 PROCEDURE — G0378 HOSPITAL OBSERVATION PER HR: HCPCS

## 2023-02-22 PROCEDURE — 64405 NJX AA&/STRD GR OCPL NRV: CPT | Performed by: PSYCHIATRY & NEUROLOGY

## 2023-02-22 PROCEDURE — 96372 THER/PROPH/DIAG INJ SC/IM: CPT

## 2023-02-22 PROCEDURE — 93246 EXT ECG>7D<15D RECORDING: CPT

## 2023-02-22 RX ORDER — LOSARTAN POTASSIUM 25 MG/1
25 TABLET ORAL DAILY
Qty: 90 TABLET | Refills: 0 | Status: SHIPPED | OUTPATIENT
Start: 2023-02-22 | End: 2023-02-22 | Stop reason: SDUPTHER

## 2023-02-22 RX ORDER — PREGABALIN 100 MG/1
100 CAPSULE ORAL 2 TIMES DAILY
Qty: 60 CAPSULE | Refills: 0 | Status: SHIPPED | OUTPATIENT
Start: 2023-02-22

## 2023-02-22 RX ORDER — LOSARTAN POTASSIUM 25 MG/1
25 TABLET ORAL DAILY
Qty: 90 TABLET | Refills: 0 | Status: SHIPPED | OUTPATIENT
Start: 2023-02-22

## 2023-02-22 RX ORDER — TOPIRAMATE 25 MG/1
TABLET ORAL
Qty: 13 TABLET | Refills: 0 | Status: SHIPPED | OUTPATIENT
Start: 2023-02-22 | End: 2023-03-03

## 2023-02-22 RX ORDER — TOPIRAMATE 25 MG/1
TABLET ORAL
Qty: 13 TABLET | Refills: 0 | Status: SHIPPED | OUTPATIENT
Start: 2023-02-22 | End: 2023-02-22 | Stop reason: SDUPTHER

## 2023-02-22 RX ORDER — PREGABALIN 100 MG/1
100 CAPSULE ORAL 2 TIMES DAILY
Qty: 60 CAPSULE | Refills: 0 | Status: SHIPPED | OUTPATIENT
Start: 2023-02-22 | End: 2023-02-22 | Stop reason: SDUPTHER

## 2023-02-22 RX ADMIN — DULOXETINE HYDROCHLORIDE 120 MG: 30 CAPSULE, DELAYED RELEASE ORAL at 09:16

## 2023-02-22 RX ADMIN — ENOXAPARIN SODIUM 40 MG: 100 INJECTION SUBCUTANEOUS at 09:16

## 2023-02-22 RX ADMIN — TOPIRAMATE 25 MG: 25 TABLET, FILM COATED ORAL at 09:16

## 2023-02-22 RX ADMIN — Medication 10 ML: at 09:17

## 2023-02-22 RX ADMIN — LOSARTAN POTASSIUM 25 MG: 50 TABLET, FILM COATED ORAL at 09:16

## 2023-02-22 RX ADMIN — PREGABALIN 100 MG: 100 CAPSULE ORAL at 09:16

## 2023-02-22 RX ADMIN — BUPROPION HYDROCHLORIDE 150 MG: 150 TABLET, FILM COATED, EXTENDED RELEASE ORAL at 09:16

## 2023-02-22 RX ADMIN — HYDROXYCHLOROQUINE SULFATE 200 MG: 200 TABLET ORAL at 09:16

## 2023-02-22 RX ADMIN — PANTOPRAZOLE SODIUM 40 MG: 40 TABLET, DELAYED RELEASE ORAL at 06:44

## 2023-02-22 RX ADMIN — BUSPIRONE HYDROCHLORIDE 5 MG: 5 TABLET ORAL at 09:16

## 2023-02-22 NOTE — DISCHARGE SUMMARY
Hospital Discharge Summary    Peggy Regan  :  1960  MRN:  [id number]    Admit date:  2023  Discharge date: 2023    Admitting Physician:    Jared Estrada MD    Discharge Diagnoses:      Recurrent syncope    Essential hypertension    Fibromyalgia    Depression    GERD (gastroesophageal reflux disease)    ANGELLA (obstructive sleep apnea)      Hospital Course:   62-year-old female admitted with recurrent syncopal episodes at home.  Felt this to be multifactorial.  Cardiology and neurology were consulted.  She was orthostatic on multiple checks and her blood pressure medicine was decreased from losartan 50 mg to losartan 25 mg.  Also felt to have a component of polypharmacy and her Topamax is being weaned over the course of 10 days.  She will do 4 more days of 25 mg twice daily and then 5 days of 25 mg daily and then stop.  Feel that Topamax is likely leading to her difficulty in cognition.  She is on multiple neuroactive medications and her trazodone has been discontinued as well due to concerns for prolonged QT in combination with Plaquenil.  She does not have an autoimmune illness and it may be worth discontinuing her Plaquenil in the outpatient setting.  She needs more aggressive medication reconciliation and medication list reduction to be performed by her PCP to limit her polypharmacy.  Also has new diagnosis of sleep apnea but not currently using CPAP.  She will need to get a CPAP as quickly as possible because this is likely contributing to her symptoms as well.  We will have her have a close follow-up with Radha Pepe NP next week and then follow-up with neurology and cardiology in the outpatient setting as well.  Zio patch will be placed at discharge.    The patient was admitted for the above noted medical/surgical issues. Please see daily progress note for further details concerning their stay. The patient improved throughout their stay and reached maximum medical improvement  on the day of discharge. The patient/family agree with the treatment plan as outlined above. All questions concerning their stay were answered prior to discharge. They understand the importance of follow up concerning any abnormal test results.     Physical Exam  Constitutional:       Appearance: She is well-developed.   HENT:      Head: Normocephalic and atraumatic.   Eyes:      Conjunctiva/sclera: Conjunctivae normal.   Cardiovascular:      Rate and Rhythm: Normal rate and regular rhythm.      Heart sounds: Normal heart sounds. No murmur heard.    No friction rub.   Pulmonary:      Effort: Pulmonary effort is normal. No respiratory distress.      Breath sounds: Normal breath sounds. No wheezing or rales.   Abdominal:      General: Bowel sounds are normal. There is no distension.      Palpations: Abdomen is soft.      Tenderness: There is no abdominal tenderness.   Musculoskeletal:         General: Normal range of motion.      Cervical back: Normal range of motion.   Skin:     Capillary Refill: Capillary refill takes less than 2 seconds.   Neurological:      Mental Status: She is alert and oriented to person, place, and time.      Cranial Nerves: No cranial nerve deficit.   Psychiatric:         Behavior: Behavior normal.         Discharge Medications:         Discharge Medications      Changes to Medications      Instructions Start Date   losartan 25 MG tablet  Commonly known as: COZAAR  What changed:   · medication strength  · how much to take   25 mg, Oral, Daily      pregabalin 100 MG capsule  Commonly known as: LYRICA  What changed:   · medication strength  · how much to take   100 mg, Oral, 2 Times Daily      topiramate 25 MG tablet  Commonly known as: TOPAMAX  What changed: See the new instructions.   Take 1 tablet by mouth 2 (Two) Times a Day for 4 days, THEN 1 tablet Daily for 5 days.   Start Date: February 22, 2023        Continue These Medications      Instructions Start Date   aspirin 81 MG EC tablet    81 mg, Oral, Daily      buPROPion  MG 24 hr tablet  Commonly known as: WELLBUTRIN XL   150 mg, Oral, Daily      busPIRone 5 MG tablet  Commonly known as: BUSPAR   10 mg, Oral, 3 Times Daily      cyclobenzaprine 10 MG tablet  Commonly known as: FLEXERIL   10 mg, Oral, 2 Times Daily PRN      DULoxetine 60 MG capsule  Commonly known as: CYMBALTA   120 mg, Oral, Daily      fenofibrate 145 MG tablet  Commonly known as: TRICOR   145 mg, Oral, Daily      hydroxychloroquine 200 MG tablet  Commonly known as: PLAQUENIL   200 mg, Oral, 2 times daily      montelukast 10 MG tablet  Commonly known as: SINGULAIR   10 mg, Oral, Daily      pantoprazole 40 MG EC tablet  Commonly known as: PROTONIX   40 mg, Oral, 2 Times Daily      simvastatin 40 MG tablet  Commonly known as: ZOCOR   40 mg, Oral, Daily      sodium bicarbonate 325 MG tablet   325 mg, Oral, 2 Times Daily      vitamin D 1.25 MG (52922 UT) capsule capsule  Commonly known as: ERGOCALCIFEROL   50,000 Units, Oral, Weekly         Stop These Medications    traZODone 100 MG tablet  Commonly known as: DESYREL            Activity: Ad akil.    Diet: Ad akil.    Consults: Cardiology, neurology    Significant Diagnostic Studies:    No radiology results for the last 7 days         Treatments:   IV fluids, medication adjustments    Disposition:   Home in stable condition    42 minutes time spent on discharge including discussion with patient/family, SW, and coordination of care.     Follow up with Radha Pepe APRN on 2/27/2023    Signed:  Jared Estrada MD   2/22/2023, 07:23 CST

## 2023-02-22 NOTE — PROCEDURES
Procedure date: 2/21/2023      The risk and benefits were explained to the patient.  Informed consent was obtained.  The bilateral septal nerves were identified using palpating techniques.  Once they were located a 1 cc syringe using a 29-gauge needle was injected into the bilateral septal nerve roots.  1 cc of a mixture of medication was injected around each nerve root.  A mixture containing 0.5 cc of 40 mg Kenalog and 0.5 cc of 1% lidocaine.  The patient tolerated the procedure well with no complications.  She experienced immediate relief from her headache.    Ramakrishna Rueda MD

## 2023-02-22 NOTE — PLAN OF CARE
Goal Outcome Evaluation:  Plan of Care Reviewed With: patient        Progress: improving     Pt alert and oriented x4. VSS. No c/o pain. SAUER. PPP. Lovenox  for VTE. , room air. Tolerating prescribed diet. Skin intact. Voiding via bathroom. Up standby.  Last BM 2/21. BS monitored this shift. Plans to d/c home today. Call light within reach. Safety maintained.

## 2023-03-06 ENCOUNTER — TELEPHONE (OUTPATIENT)
Dept: NEUROLOGY | Facility: CLINIC | Age: 63
End: 2023-03-06

## 2023-03-06 NOTE — TELEPHONE ENCOUNTER
HOSPITAL FOLLOW-UP REQUEST    Caller: Peggy Regan    Relationship to patient: Self    Best call back number: [phone]    New or established patient? [x] New  [] Established   PT HAS AN UPCOMING NEW PATIENT APPT W/ GALLO WADE ON 4/28/23 (WHICH WAS SCHEDULED PRIOR TO PT'S ADMISSION)    Date of admission: 2/20/2023    Date of discharge: 2/22/2023    Length of stay (If applicable): 2 DAYS    Facility discharged from: [facility]    Diagnosis/Symptoms: SYNCOPAL EPISODES (ALSO HAS MEMORY IMPAIRMENT)    Suggested follow-up timeframe: NOT INDICATED    Specialty Only: Did you see a [facility] [place] health provider?    [x] Yes  [] No    If so, who? DR. AZAR & GABY BETH, APRN    Additional notes: PT CALLED TO SCHEDULE HOSPITAL F/U APPT W/ DR. DIAL PER DISCHARGE SUMMARY. PT HAS AN EXISTING NEW PATIENT APPT W/ GALLO WADE ON 4/28/23 BUT PT STATES SHE WAS ADVISED TO F/U FOR SOONER APPT.    PLEASE REVIEW AND ADVISE.

## 2023-03-09 NOTE — TELEPHONE ENCOUNTER
Provider: DR DIAL  Caller: PATIENT  Relationship to Patient: SELF  Phone Number: [phone]  Reason for Call: PATIENT CALLED TO CHECK THE STATUS OF GETTING A HOSP FOLLOW UP. PLEASE REVIEW AND ADVISE, THANK YOU.

## 2023-03-10 NOTE — TELEPHONE ENCOUNTER
Explained that Dr. Crawley doesn't have an appointment available before her appointment with Nader on 4-28.  She can call to check for cancellations.

## 2023-03-14 ENCOUNTER — TELEPHONE (OUTPATIENT)
Dept: NEUROLOGY | Facility: CLINIC | Age: 63
End: 2023-03-14
Payer: MEDICARE

## 2023-03-14 NOTE — TELEPHONE ENCOUNTER
----- Message from Ramakrishna Rueda MD sent at 3/14/2023  2:57 PM CDT -----  no  ----- Message -----  From: Shante España LPN  Sent: 3/13/2023  10:21 AM CDT  To: Ramakrishna Rueda MD    Anahy has a follow-up on 4-28.  She wants to know if she can drive before her appointment.

## 2023-03-21 ENCOUNTER — HOSPITAL ENCOUNTER (OUTPATIENT)
Dept: CARDIOLOGY | Facility: HOSPITAL | Age: 63
Discharge: HOME | End: 2023-03-21
Admitting: FAMILY MEDICINE
Payer: COMMERCIAL

## 2023-03-21 PROCEDURE — 93246 EXT ECG>7D<15D RECORDING: CPT

## 2023-04-10 ENCOUNTER — OFFICE VISIT (OUTPATIENT)
Dept: CARDIOLOGY | Facility: CLINIC | Age: 63
End: 2023-04-10
Payer: MEDICARE

## 2023-04-10 VITALS
WEIGHT: 198 LBS | DIASTOLIC BLOOD PRESSURE: 80 MMHG | SYSTOLIC BLOOD PRESSURE: 132 MMHG | HEIGHT: 65 IN | HEART RATE: 91 BPM | BODY MASS INDEX: 32.99 KG/M2 | OXYGEN SATURATION: 95 %

## 2023-04-10 DIAGNOSIS — R55 RECURRENT SYNCOPE: Primary | ICD-10-CM

## 2023-04-10 DIAGNOSIS — I65.22 CAROTID STENOSIS, ASYMPTOMATIC, LEFT: ICD-10-CM

## 2023-04-10 DIAGNOSIS — E66.2 CLASS 1 OBESITY WITH ALVEOLAR HYPOVENTILATION, SERIOUS COMORBIDITY, AND BODY MASS INDEX (BMI) OF 32.0 TO 32.9 IN ADULT: ICD-10-CM

## 2023-04-10 DIAGNOSIS — I10 ESSENTIAL HYPERTENSION: ICD-10-CM

## 2023-04-10 DIAGNOSIS — G47.33 OSA (OBSTRUCTIVE SLEEP APNEA): Chronic | ICD-10-CM

## 2023-04-10 DIAGNOSIS — E78.2 MIXED HYPERLIPIDEMIA: ICD-10-CM

## 2023-04-10 PROBLEM — E66.811 CLASS 1 OBESITY WITH ALVEOLAR HYPOVENTILATION, SERIOUS COMORBIDITY, AND BODY MASS INDEX (BMI) OF 32.0 TO 32.9 IN ADULT: Status: ACTIVE | Noted: 2023-04-10

## 2023-04-10 LAB
MAXIMAL PREDICTED HEART RATE: 158 BPM
STRESS TARGET HR: 134 BPM

## 2023-04-10 PROCEDURE — 3075F SYST BP GE 130 - 139MM HG: CPT | Performed by: INTERNAL MEDICINE

## 2023-04-10 PROCEDURE — 99214 OFFICE O/P EST MOD 30 MIN: CPT | Performed by: INTERNAL MEDICINE

## 2023-04-10 PROCEDURE — 3079F DIAST BP 80-89 MM HG: CPT | Performed by: INTERNAL MEDICINE

## 2023-04-10 NOTE — PROGRESS NOTES
Reason for Visit: cardiovascular follow up.    HPI:  Peggy Regan is a 62 y.o. female is here today for hospital follow-up.  She is previously seen in consultation during hospital stay on 2/20/2023.  She had recurrent episodes of loss of consciousness that brought her into the hospital.  She was discharged home with a Holter monitor that did not show any significant arrhythmias.  She was doing well until recently and just started getting dizzy when she is standing.  She has not had any episodes while sitting like she did prior to admission.  Her blood pressure has been running a little high at times.  She has backed off the caffeine.  She has been drinking plenty of fluids and staying hydrated.  Her losartan was recently decreased to 25 mg.       Previous Cardiac Testing and Procedures:  -Echo (9/15/2016) EF 55-60%, mild LVH, no significant valvular abnormalities, trivial pericardial effusion  -MRI brain (1/16/2023) findings at the right parietotemporal lobe and right cerebellum suggestive of prior trauma or surgery, partial effacement of the anterior aspect of the right lateral ventricle which may represent volume averaging  -Carotid ultrasound (1/18/2023) less than 50% right ICA, 50 to 69% left ICA  -Echo (2/21/2023) EF 66-70%, normal diastolic function, normal RV size and function, no significant valve dysfunction, normal RVSP  -Holter monitor (2/22/2023) sinus rhythm throughout with rare PACs and PVCs, no significant pauses, arrhythmias, or events, no symptoms reported    Lab data:  -Lipid panel (2/20/2023) total cholesterol 203, HDL 63, , triglycerides 118    Patient Active Problem List   Diagnosis   • Essential hypertension   • Mixed hyperlipidemia   • Shortness of breath   • Fibromyalgia   • Depression   • DDD (degenerative disc disease), cervical   • DDD (degenerative disc disease), lumbosacral   • GERD (gastroesophageal reflux disease)   • ANGELLA (obstructive sleep apnea)   • Asthma   • Syncope    • S/P GIOVANNA-BSO   • S/P cervical spinal fusion   • S/P  section   • Hx of colonic polyps   • Family history of polyps in the colon   • Recurrent syncope   • Carotid stenosis, asymptomatic, left   • Class 1 obesity with alveolar hypoventilation, serious comorbidity, and body mass index (BMI) of 32.0 to 32.9 in adult       Social History     Tobacco Use   • Smoking status: Never   • Smokeless tobacco: Never   Vaping Use   • Vaping Use: Never used   Substance Use Topics   • Alcohol use: Not Currently   • Drug use: No       Family History   Problem Relation Age of Onset   • Heart disease Mother    • Colon polyps Mother         > 60 years of age    • Colon polyps Father         > 60 years of age    • Hypertension Sister    • Colon cancer Neg Hx    • Esophageal cancer Neg Hx    • Liver cancer Neg Hx    • Liver disease Neg Hx    • Rectal cancer Neg Hx    • Stomach cancer Neg Hx        The following portions of the patient's history were reviewed and updated as appropriate: allergies, current medications, past family history, past medical history, past social history, past surgical history and problem list.      Current Outpatient Medications:   •  aspirin 81 MG EC tablet, Take 1 tablet by mouth Daily., Disp: , Rfl:   •  buPROPion XL (WELLBUTRIN XL) 150 MG 24 hr tablet, Take 1 tablet by mouth Daily., Disp: , Rfl:   •  busPIRone (BUSPAR) 5 MG tablet, Take 2 tablets by mouth 3 (Three) Times a Day., Disp: , Rfl:   •  cyclobenzaprine (FLEXERIL) 10 MG tablet, Take 1 tablet by mouth 2 (Two) Times a Day As Needed for Muscle Spasms., Disp: , Rfl:   •  DULoxetine (CYMBALTA) 60 MG capsule, Take 2 capsules by mouth Daily., Disp: , Rfl:   •  fenofibrate (TRICOR) 145 MG tablet, Take 1 tablet by mouth Daily., Disp: , Rfl:   •  losartan (COZAAR) 25 MG tablet, Take 1 tablet by mouth Daily., Disp: 90 tablet, Rfl: 0  •  montelukast (SINGULAIR) 10 MG tablet, Take 1 tablet by mouth Daily., Disp: , Rfl:   •  pantoprazole (PROTONIX) 40  "MG EC tablet, Take 1 tablet by mouth 2 (Two) Times a Day., Disp: , Rfl:   •  pregabalin (LYRICA) 100 MG capsule, Take 1 capsule by mouth 2 (Two) Times a Day., Disp: 60 capsule, Rfl: 0  •  simvastatin (ZOCOR) 40 MG tablet, Take 1 tablet by mouth Daily., Disp: , Rfl:   •  sodium bicarbonate 325 MG tablet, Take 1 tablet by mouth 2 (Two) Times a Day., Disp: , Rfl:   •  vitamin D (ERGOCALCIFEROL) 1.25 MG (42444 UT) capsule capsule, Take 1 capsule by mouth 1 (One) Time Per Week., Disp: , Rfl:   •  topiramate (TOPAMAX) 25 MG tablet, Take 1 tablet by mouth 2 (Two) Times a Day for 4 days, THEN 1 tablet Daily for 5 days., Disp: 13 tablet, Rfl: 0    Review of Systems   Constitutional: Negative for chills and fever.   Cardiovascular: Negative for chest pain and paroxysmal nocturnal dyspnea.   Respiratory: Negative for cough and shortness of breath.    Skin: Negative for rash.   Gastrointestinal: Negative for abdominal pain and heartburn.   Neurological: Positive for dizziness. Negative for numbness.       Objective   /80 (BP Location: Left arm, Patient Position: Sitting, Cuff Size: Adult)   Pulse 91   Ht 165.1 cm (65\")   Wt 89.8 kg (198 lb)   SpO2 95%   BMI 32.95 kg/m²   Constitutional:       Appearance: Well-developed.   HENT:      Head: Normocephalic and atraumatic.   Pulmonary:      Effort: Pulmonary effort is normal.      Breath sounds: Normal breath sounds.   Cardiovascular:      Normal rate. Regular rhythm.      Murmurs: There is no murmur.   Edema:     Peripheral edema absent.   Skin:     General: Skin is warm and dry.   Neurological:      Mental Status: Alert and oriented to person, place, and time.       Procedures      ICD-10-CM ICD-9-CM   1. Recurrent syncope  R55 780.2   2. Essential hypertension  I10 401.9   3. Mixed hyperlipidemia  E78.2 272.2   4. Carotid stenosis, asymptomatic, left  I65.22 433.10   5. ANGELLA (obstructive sleep apnea)  G47.33 327.23   6. Class 1 obesity with alveolar hypoventilation, " serious comorbidity, and body mass index (BMI) of 32.0 to 32.9 in adult  E66.2 278.03    Z68.32 V85.32         Assessment/Plan:  1.  Recurrent syncope: Remains no evidence of any cardiac etiology to her symptoms.  No recurrent episodes since discharge.  Dehydration and blood pressure may have contributed.  Encouraged her to remain well-hydrated and avoid aggressive blood pressure control.    2.  Essential hypertension: Blood pressure is acceptable today.  Given history of syncope as well as orthostatic symptoms.  Recommend avoid aggressive blood pressure control.  Agree with recent decrease of losartan down to 25 mg.    3.  Mixed hyperlipidemia: Mild elevation on lipid panel from 2/20/2023.  Managed on fenofibrate and simvastatin.  Lifestyle Modification.    4.  Carotid stenosis: Nonobstructive disease up to 50 to 69% left ICA.  Follows with Dr Martinez.  Continue aspirin and simvastatin.    5.  Obstructive sleep apnea: She is now on a new CPAP.    6.  Obesity: BMI is >= 30 and <35. (Class 1 Obesity). The following options were offered after discussion;: exercise counseling/recommendations and nutrition counseling/recommendations

## 2023-04-10 NOTE — LETTER
April 10, 2023     JEREMÍAS Wilkes  [address] [address]    Patient: Peggy Regan   YOB: 1960   Date of Visit: 4/10/2023       Dear JEREMÍAS Wilkes    Peggy Regan was in my office today. Below is a copy of my note.    If you have questions, please do not hesitate to call me. I look forward to following Peggy along with you.         Sincerely,        Torrey Anderson MD        CC: No Recipients      Reason for Visit: cardiovascular follow up.    HPI:  Peggy Regan is a 62 y.o. female is here today for hospital follow-up.  She is previously seen in consultation during hospital stay on 2/20/2023.  She had recurrent episodes of loss of consciousness that brought her into the hospital.  She was discharged home with a Holter monitor that did not show any significant arrhythmias.  She was doing well until recently and just started getting dizzy when she is standing.  She has not had any episodes while sitting like she did prior to admission.  Her blood pressure has been running a little high at times.  She has backed off the caffeine.  She has been drinking plenty of fluids and staying hydrated.  Her losartan was recently decreased to 25 mg.       Previous Cardiac Testing and Procedures:  -Echo (9/15/2016) EF 55-60%, mild LVH, no significant valvular abnormalities, trivial pericardial effusion  -MRI brain (1/16/2023) findings at the right parietotemporal lobe and right cerebellum suggestive of prior trauma or surgery, partial effacement of the anterior aspect of the right lateral ventricle which may represent volume averaging  -Carotid ultrasound (1/18/2023) less than 50% right ICA, 50 to 69% left ICA  -Echo (2/21/2023) EF 66-70%, normal diastolic function, normal RV size and function, no significant valve dysfunction, normal RVSP  -Holter monitor (2/22/2023) sinus rhythm throughout with rare PACs and PVCs, no significant pauses, arrhythmias, or events, no symptoms  reported    Lab data:  -Lipid panel (2023) total cholesterol 203, HDL 63, , triglycerides 118    Patient Active Problem List   Diagnosis   • Essential hypertension   • Mixed hyperlipidemia   • Shortness of breath   • Fibromyalgia   • Depression   • DDD (degenerative disc disease), cervical   • DDD (degenerative disc disease), lumbosacral   • GERD (gastroesophageal reflux disease)   • ANGELLA (obstructive sleep apnea)   • Asthma   • Syncope   • S/P GIOVANNA-BSO   • S/P cervical spinal fusion   • S/P  section   • Hx of colonic polyps   • Family history of polyps in the colon   • Recurrent syncope   • Carotid stenosis, asymptomatic, left   • Class 1 obesity with alveolar hypoventilation, serious comorbidity, and body mass index (BMI) of 32.0 to 32.9 in adult       Social History     Tobacco Use   • Smoking status: Never   • Smokeless tobacco: Never   Vaping Use   • Vaping Use: Never used   Substance Use Topics   • Alcohol use: Not Currently   • Drug use: No       Family History   Problem Relation Age of Onset   • Heart disease Mother    • Colon polyps Mother         > 60 years of age    • Colon polyps Father         > 60 years of age    • Hypertension Sister    • Colon cancer Neg Hx    • Esophageal cancer Neg Hx    • Liver cancer Neg Hx    • Liver disease Neg Hx    • Rectal cancer Neg Hx    • Stomach cancer Neg Hx        The following portions of the patient's history were reviewed and updated as appropriate: allergies, current medications, past family history, past medical history, past social history, past surgical history and problem list.      Current Outpatient Medications:   •  aspirin 81 MG EC tablet, Take 1 tablet by mouth Daily., Disp: , Rfl:   •  buPROPion XL (WELLBUTRIN XL) 150 MG 24 hr tablet, Take 1 tablet by mouth Daily., Disp: , Rfl:   •  busPIRone (BUSPAR) 5 MG tablet, Take 2 tablets by mouth 3 (Three) Times a Day., Disp: , Rfl:   •  cyclobenzaprine (FLEXERIL) 10 MG tablet, Take 1 tablet by  "mouth 2 (Two) Times a Day As Needed for Muscle Spasms., Disp: , Rfl:   •  DULoxetine (CYMBALTA) 60 MG capsule, Take 2 capsules by mouth Daily., Disp: , Rfl:   •  fenofibrate (TRICOR) 145 MG tablet, Take 1 tablet by mouth Daily., Disp: , Rfl:   •  losartan (COZAAR) 25 MG tablet, Take 1 tablet by mouth Daily., Disp: 90 tablet, Rfl: 0  •  montelukast (SINGULAIR) 10 MG tablet, Take 1 tablet by mouth Daily., Disp: , Rfl:   •  pantoprazole (PROTONIX) 40 MG EC tablet, Take 1 tablet by mouth 2 (Two) Times a Day., Disp: , Rfl:   •  pregabalin (LYRICA) 100 MG capsule, Take 1 capsule by mouth 2 (Two) Times a Day., Disp: 60 capsule, Rfl: 0  •  simvastatin (ZOCOR) 40 MG tablet, Take 1 tablet by mouth Daily., Disp: , Rfl:   •  sodium bicarbonate 325 MG tablet, Take 1 tablet by mouth 2 (Two) Times a Day., Disp: , Rfl:   •  vitamin D (ERGOCALCIFEROL) 1.25 MG (35394 UT) capsule capsule, Take 1 capsule by mouth 1 (One) Time Per Week., Disp: , Rfl:   •  topiramate (TOPAMAX) 25 MG tablet, Take 1 tablet by mouth 2 (Two) Times a Day for 4 days, THEN 1 tablet Daily for 5 days., Disp: 13 tablet, Rfl: 0    Review of Systems   Constitutional: Negative for chills and fever.   Cardiovascular: Negative for chest pain and paroxysmal nocturnal dyspnea.   Respiratory: Negative for cough and shortness of breath.    Skin: Negative for rash.   Gastrointestinal: Negative for abdominal pain and heartburn.   Neurological: Positive for dizziness. Negative for numbness.       Objective    /80 (BP Location: Left arm, Patient Position: Sitting, Cuff Size: Adult)   Pulse 91   Ht 165.1 cm (65\")   Wt 89.8 kg (198 lb)   SpO2 95%   BMI 32.95 kg/m²   Constitutional:       Appearance: Well-developed.   HENT:      Head: Normocephalic and atraumatic.   Pulmonary:      Effort: Pulmonary effort is normal.      Breath sounds: Normal breath sounds.   Cardiovascular:      Normal rate. Regular rhythm.      Murmurs: There is no murmur.   Edema:     Peripheral " edema absent.   Skin:     General: Skin is warm and dry.   Neurological:      Mental Status: Alert and oriented to person, place, and time.       Procedures      ICD-10-CM ICD-9-CM   1. Recurrent syncope  R55 780.2   2. Essential hypertension  I10 401.9   3. Mixed hyperlipidemia  E78.2 272.2   4. Carotid stenosis, asymptomatic, left  I65.22 433.10   5. ANGELLA (obstructive sleep apnea)  G47.33 327.23   6. Class 1 obesity with alveolar hypoventilation, serious comorbidity, and body mass index (BMI) of 32.0 to 32.9 in adult  E66.2 278.03    Z68.32 V85.32         Assessment/Plan:  1.  Recurrent syncope: Remains no evidence of any cardiac etiology to her symptoms.  No recurrent episodes since discharge.  Dehydration and blood pressure may have contributed.  Encouraged her to remain well-hydrated and avoid aggressive blood pressure control.    2.  Essential hypertension: Blood pressure is acceptable today.  Given history of syncope as well as orthostatic symptoms.  Recommend avoid aggressive blood pressure control.  Agree with recent decrease of losartan down to 25 mg.    3.  Mixed hyperlipidemia: Mild elevation on lipid panel from 2/20/2023.  Managed on fenofibrate and simvastatin.  Lifestyle Modification.    4.  Carotid stenosis: Nonobstructive disease up to 50 to 69% left ICA.  Follows with Dr Martinez.  Continue aspirin and simvastatin.    5.  Obstructive sleep apnea: She is now on a new CPAP.    6.  Obesity: BMI is >= 30 and <35. (Class 1 Obesity). The following options were offered after discussion;: exercise counseling/recommendations and nutrition counseling/recommendations

## 2023-04-28 ENCOUNTER — OFFICE VISIT (OUTPATIENT)
Dept: NEUROLOGY | Facility: CLINIC | Age: 63
End: 2023-04-28
Payer: MEDICARE

## 2023-04-28 VITALS
DIASTOLIC BLOOD PRESSURE: 90 MMHG | HEIGHT: 65 IN | HEART RATE: 90 BPM | SYSTOLIC BLOOD PRESSURE: 140 MMHG | BODY MASS INDEX: 32.49 KG/M2 | WEIGHT: 195 LBS | OXYGEN SATURATION: 97 %

## 2023-04-28 DIAGNOSIS — R55 SYNCOPE, UNSPECIFIED SYNCOPE TYPE: Primary | ICD-10-CM

## 2023-04-28 DIAGNOSIS — M54.81 BILATERAL OCCIPITAL NEURALGIA: ICD-10-CM

## 2023-04-28 NOTE — PROGRESS NOTES
Subjective   Peggy Regan is a 62 y.o. female who presents for a hospital follow-up with having been admitted with headache and orthostasis. She recently saw cardiology and was cleared. She returns today with improved symptoms. She is with her  today. She does have some ongoing issues with chronic musculoskeletal pain. She has a 30-year history of fibromyalgia. She is accompanied by her  today.    History of Present Illness     Orthostatic symptoms  The patient states that she has been feeling a lot better since she was in the hospital. She reports that she has had a few dizzy spells, but she takes deep breaths or sits down in a chair, and that is helpful. She states that she was having trouble with remembering. She confirms that she was on Topamax. She states that she is back to normal activity at the house, but she cannot drive until she is cleared to drive. She notes that she would like to drive again.    Sleep apnea  Her  states that she was using a CPAP machine, but she quit using it. She states that she feels more rested when she wakes up in the morning when she uses it.    The following portions of the patient's history were reviewed and updated as appropriate: allergies, current medications, past family history, past medical history, past social history, past surgical history and problem list.    Review of Systems:  A review of systems was performed, and positive findings are noted in the HPI.      Current Outpatient Medications:   •  aspirin 81 MG EC tablet, Take 1 tablet by mouth Daily., Disp: , Rfl:   •  buPROPion XL (WELLBUTRIN XL) 150 MG 24 hr tablet, Take 1 tablet by mouth Daily., Disp: , Rfl:   •  busPIRone (BUSPAR) 5 MG tablet, Take 1 tablet by mouth 2 (Two) Times a Day., Disp: , Rfl:   •  cyclobenzaprine (FLEXERIL) 10 MG tablet, Take 1 tablet by mouth 2 (Two) Times a Day As Needed for Muscle Spasms., Disp: , Rfl:   •  DULoxetine (CYMBALTA) 60 MG capsule, Take 2 capsules by  mouth Daily., Disp: , Rfl:   •  fenofibrate (TRICOR) 145 MG tablet, Take 1 tablet by mouth Daily., Disp: , Rfl:   •  losartan (COZAAR) 25 MG tablet, Take 1 tablet by mouth Daily., Disp: 90 tablet, Rfl: 0  •  montelukast (SINGULAIR) 10 MG tablet, Take 1 tablet by mouth Daily., Disp: , Rfl:   •  pantoprazole (PROTONIX) 40 MG EC tablet, Take 1 tablet by mouth 2 (Two) Times a Day., Disp: , Rfl:   •  pregabalin (LYRICA) 100 MG capsule, Take 1 capsule by mouth 2 (Two) Times a Day., Disp: 60 capsule, Rfl: 0  •  simvastatin (ZOCOR) 40 MG tablet, Take 1 tablet by mouth Daily., Disp: , Rfl:   •  sodium bicarbonate 325 MG tablet, Take 1 tablet by mouth 2 (Two) Times a Day., Disp: , Rfl:   •  vitamin D (ERGOCALCIFEROL) 1.25 MG (33732 UT) capsule capsule, Take 1 capsule by mouth 1 (One) Time Per Week., Disp: , Rfl:      Objective   Physical Exam  Vitals and nursing note reviewed.   HENT:      Head: Normocephalic.      Right Ear: Hearing and external ear normal.      Left Ear: Hearing and external ear normal.      Nose: Nose normal.      Mouth/Throat:      Pharynx: Oropharynx is clear.   Eyes:      General: Lids are normal. Vision grossly intact. Gaze aligned appropriately. No scleral icterus.     Extraocular Movements: Extraocular movements intact.      Conjunctiva/sclera: Conjunctivae normal.      Pupils: Pupils are equal, round, and reactive to light.      Visual Fields: Right eye visual fields normal and left eye visual fields normal.   Neck:      Vascular: No carotid bruit or JVD.      Trachea: Trachea and phonation normal.   Cardiovascular:      Rate and Rhythm: Normal rate.      Heart sounds: Normal heart sounds.   Pulmonary:      Effort: Pulmonary effort is normal.      Breath sounds: Normal breath sounds.   Musculoskeletal:      Cervical back: Normal range of motion.   Skin:     General: Skin is warm and dry.   Neurological:      Mental Status: She is alert and oriented to person, place, and time.      GCS: GCS eye  subscore is 4. GCS verbal subscore is 5. GCS motor subscore is 6.      Sensory: Sensation is intact.      Motor: Motor function is intact.      Coordination: Coordination is intact.      Gait: Gait is intact.      Deep Tendon Reflexes: Reflexes are normal and symmetric.   Psychiatric:         Attention and Perception: Attention and perception normal.         Mood and Affect: Mood and affect normal.         Speech: Speech normal.         Behavior: Behavior normal.         Thought Content: Thought content normal.         Cognition and Memory: Cognition and memory normal.         Judgment: Judgment normal.           Assessment & Plan   Diagnoses and all orders for this visit:    1. Syncope, unspecified syncope type (Primary)    2. Bilateral occipital neuralgia        1. Recent admission for orthostatic symptoms  - Resolved. The patient may resume normal activities including driving. She will return on an as needed basis to neurology.             Transcribed from ambient dictation for GALLO Koroma by Bee Asencio.  04/28/23   11:24 CDT    Patient or patient representative verbalized consent to the visit recording.  I have personally performed the services described in this document as transcribed by the above individual, and it is both accurate and complete.

## 2023-05-09 ENCOUNTER — HOSPITAL ENCOUNTER (EMERGENCY)
Facility: HOSPITAL | Age: 63
Discharge: HOME | End: 2023-05-09
Attending: STUDENT IN AN ORGANIZED HEALTH CARE EDUCATION/TRAINING PROGRAM | Admitting: STUDENT IN AN ORGANIZED HEALTH CARE EDUCATION/TRAINING PROGRAM
Payer: COMMERCIAL

## 2023-05-09 ENCOUNTER — APPOINTMENT (OUTPATIENT)
Dept: GENERAL RADIOLOGY | Facility: HOSPITAL | Age: 63
End: 2023-05-09
Payer: COMMERCIAL

## 2023-05-09 VITALS
OXYGEN SATURATION: 97 % | HEIGHT: 65 IN | HEART RATE: 104 BPM | RESPIRATION RATE: 19 BRPM | WEIGHT: 200 LBS | DIASTOLIC BLOOD PRESSURE: 86 MMHG | TEMPERATURE: 98.7 F | SYSTOLIC BLOOD PRESSURE: 99 MMHG | BODY MASS INDEX: 33.32 KG/M2

## 2023-05-09 DIAGNOSIS — U07.1 COVID-19: Primary | ICD-10-CM

## 2023-05-09 LAB
ANION GAP SERPL CALCULATED.3IONS-SCNC: 16 MMOL/L (ref 5–15)
BASOPHILS # BLD AUTO: 0.09 10*3/MM3 (ref 0–0.2)
BASOPHILS NFR BLD AUTO: 0.9 % (ref 0–1.5)
BUN SERPL-MCNC: 15 MG/DL (ref 8–23)
BUN/CREAT SERPL: 15 (ref 7–25)
CALCIUM SPEC-SCNC: 9.3 MG/DL (ref 8.6–10.5)
CHLORIDE SERPL-SCNC: 101 MMOL/L (ref 98–107)
CO2 SERPL-SCNC: 21 MMOL/L (ref 22–29)
CREAT SERPL-MCNC: 1 MG/DL (ref 0.57–1)
DEPRECATED RDW RBC AUTO: 43.1 FL (ref 37–54)
EGFRCR SERPLBLD CKD-EPI 2021: 63.8 ML/MIN/1.73
EOSINOPHIL # BLD AUTO: 0.26 10*3/MM3 (ref 0–0.4)
EOSINOPHIL NFR BLD AUTO: 2.7 % (ref 0.3–6.2)
ERYTHROCYTE [DISTWIDTH] IN BLOOD BY AUTOMATED COUNT: 13.6 % (ref 12.3–15.4)
GEN 5 2HR TROPONIN T REFLEX: 9 NG/L
GLUCOSE SERPL-MCNC: 101 MG/DL (ref 65–99)
HCT VFR BLD AUTO: 37.1 % (ref 34–46.6)
HGB BLD-MCNC: 11.9 G/DL (ref 12–15.9)
IMM GRANULOCYTES # BLD AUTO: 0.22 10*3/MM3 (ref 0–0.05)
IMM GRANULOCYTES NFR BLD AUTO: 2.3 % (ref 0–0.5)
LYMPHOCYTES # BLD AUTO: 3.03 10*3/MM3 (ref 0.7–3.1)
LYMPHOCYTES NFR BLD AUTO: 31.6 % (ref 19.6–45.3)
MAGNESIUM SERPL-MCNC: 1.9 MG/DL (ref 1.6–2.4)
MCH RBC QN AUTO: 27.9 PG (ref 26.6–33)
MCHC RBC AUTO-ENTMCNC: 32.1 G/DL (ref 31.5–35.7)
MCV RBC AUTO: 86.9 FL (ref 79–97)
MONOCYTES # BLD AUTO: 0.92 10*3/MM3 (ref 0.1–0.9)
MONOCYTES NFR BLD AUTO: 9.6 % (ref 5–12)
NEUTROPHILS NFR BLD AUTO: 5.07 10*3/MM3 (ref 1.7–7)
NEUTROPHILS NFR BLD AUTO: 52.9 % (ref 42.7–76)
NRBC BLD AUTO-RTO: 0 /100 WBC (ref 0–0.2)
PLATELET # BLD AUTO: 334 10*3/MM3 (ref 140–450)
PMV BLD AUTO: 9.6 FL (ref 6–12)
POTASSIUM SERPL-SCNC: 4 MMOL/L (ref 3.5–5.2)
RBC # BLD AUTO: 4.27 10*6/MM3 (ref 3.77–5.28)
SODIUM SERPL-SCNC: 138 MMOL/L (ref 136–145)
TROPONIN T DELTA: 0 NG/L
TROPONIN T SERPL HS-MCNC: 9 NG/L
WBC NRBC COR # BLD: 9.59 10*3/MM3 (ref 3.4–10.8)

## 2023-05-09 PROCEDURE — 80048 BASIC METABOLIC PNL TOTAL CA: CPT | Performed by: STUDENT IN AN ORGANIZED HEALTH CARE EDUCATION/TRAINING PROGRAM

## 2023-05-09 PROCEDURE — 83735 ASSAY OF MAGNESIUM: CPT | Performed by: STUDENT IN AN ORGANIZED HEALTH CARE EDUCATION/TRAINING PROGRAM

## 2023-05-09 PROCEDURE — 71045 X-RAY EXAM CHEST 1 VIEW: CPT

## 2023-05-09 PROCEDURE — 99283 EMERGENCY DEPT VISIT LOW MDM: CPT

## 2023-05-09 PROCEDURE — 85025 COMPLETE CBC W/AUTO DIFF WBC: CPT | Performed by: STUDENT IN AN ORGANIZED HEALTH CARE EDUCATION/TRAINING PROGRAM

## 2023-05-09 PROCEDURE — 84484 ASSAY OF TROPONIN QUANT: CPT | Performed by: STUDENT IN AN ORGANIZED HEALTH CARE EDUCATION/TRAINING PROGRAM

## 2023-05-09 PROCEDURE — 36415 COLL VENOUS BLD VENIPUNCTURE: CPT

## 2023-05-09 NOTE — DISCHARGE INSTRUCTIONS
Please come back for severe shortness of breath, if your oxygen levels drop below 90%, if you sustain a high heart rate and feel lightheaded, if you pass out, or for any other emergencies.

## 2023-05-09 NOTE — ED PROVIDER NOTES
Subjective   History of Present Illness  Patient presents due to difficulty breathing, chest pain.  She has had COVID-19 for 7 days.  She was seen at [facility] today due to trouble breathing.  She was on room air, satting well, however had tachycardia.  She also reports that they said that she had concerns for changes on her EKG (unspecified and I do not have ECG from there available) which prompted her being sent here to the ER.  She has had some burning chest pain sometimes and ever she has trouble breathing and feels like she has increased work of breathing.  She has not had any syncope, occasionally feels lightheaded.  Able to eat and drink.  No fevers.  Developed cough 3 days ago that is mostly productive of small amounts of clear sputum    Review of Systems   Constitutional: Positive for chills. Negative for fever.   Respiratory: Positive for cough and shortness of breath.    Cardiovascular: Positive for chest pain (occasional burning when she is short of breath). Negative for palpitations.   Gastrointestinal: Negative for abdominal pain and vomiting.   Genitourinary: Negative for difficulty urinating and dysuria.   Neurological: Positive for light-headedness. Negative for syncope.       Past Medical History:   Diagnosis Date   • Arthritis    • Asthma    • Cancer [id number]    Squamous skin   • Carotid artery occlusion [id number]   • Chronic kidney failure     stage 3a   • Cough     multifactorial and adversely affected by shy and obesity   • Degenerative disc disease, lumbar    • Degenerative disc disease, thoracic    • Depression    • Family history of colonic polyps    • Fibromyalgia    • Fibromyalgia, primary 30 years   • Functional dyspepsia    • GERD (gastroesophageal reflux disease)    • Head injury January 2023    Showed on mri   • Headache, tension-type     Years I have had HA   • History of colon polyps    • Hypercholesteremia    • Hypertension    • Memory loss December 2022    I think memory going a  couple years ago   • Sleep apnea     CPAP   • Spinal headache    • Syncope    • Type 2 diabetes mellitus    • Vision loss     Wear glasses   • Wears glasses        Allergies   Allergen Reactions   • Tetracyclines & Related Swelling     Mouth swelling   • Erythromycin Nausea And Vomiting   • Penicillins Hives       Past Surgical History:   Procedure Laterality Date   • ANTERIOR CERVICAL FUSION  2016   • BRAVO PROCEDURE  2011    Borderline test while off PPI; Day 1 suggestive of reflux disease and Day 2 is negative   •  SECTION      X2   • CHOLECYSTECTOMY     • COLONOSCOPY  10/29/2015    One 5mm polyp in the sigmoid colon-complete resection, polyp tissue not retrieved; Examination was otherwise normal on direct and retroflexion views; Repeat 5 years    • COLONOSCOPY  2003    Normal; Repeat at age 50   • COLONOSCOPY  2010    Internal hemorrhoids; One 1cm polyp in the descending colon-path shows benign submucosal leiomyoma; Repeat 3 years   • COLONOSCOPY  1992    Dr. Arredondo-Rox colonoscopy   • COLONOSCOPY N/A 2021    Procedure: COLONOSCOPY WITH ANESTHESIA;  Surgeon: Peggy Zuñiga MD;  Location: [facility] ENDOSCOPY;  Service: Gastroenterology;  Laterality: N/A;  preop; hx of polyps  postop  PCP none    • ENDOSCOPY  2011    Normal esophagus; Normal stomach; Normal examined duodenum; BRAVO capsule placed    • ENDOSCOPY  2010    Suboptimal view due to retained gastric contents; Will check SPGE scan; Will repeat endo/MAC with BRAVO replacement   • ENDOSCOPY  2003    Normal; RTC 3 months   • ENDOSCOPY  1994    Dr. Morales endoscopy; Slant taken for H.Pylori   • HYSTERECTOMY     • KNEE ARTHROSCOPY     • VENTRAL/INCISIONAL HERNIA REPAIR N/A 2016    Procedure: VENTRAL/INCISIONAL HERNIA REPAIR WITH MESH;  Surgeon: Mckenna Adair MD;  Location: [facility] OR;  Service:        Family History   Problem Relation Age of Onset   • Heart disease Mother    •  Colon polyps Mother         > 60 years of age    • Dementia Mother    • Stroke Mother    • Colon polyps Father         > 60 years of age    • Hypertension Sister    • Migraines Sister    • Neuropathy Sister         Trigeminal neuralgia   • Colon cancer Neg Hx    • Esophageal cancer Neg Hx    • Liver cancer Neg Hx    • Liver disease Neg Hx    • Rectal cancer Neg Hx    • Stomach cancer Neg Hx        Social History     Socioeconomic History   • Marital status:    Tobacco Use   • Smoking status: Never   • Smokeless tobacco: Never   Vaping Use   • Vaping Use: Never used   Substance and Sexual Activity   • Alcohol use: Never   • Drug use: Never   • Sexual activity: Not Currently     Partners: Male     Birth control/protection: Surgical, Post-menopausal, Hysterectomy           Objective   Physical Exam  Vitals reviewed.   Constitutional:       General: She is not in acute distress.  HENT:      Head: Normocephalic and atraumatic.      Comments: No focal intraoral swelling.  No uvular deviation.  No posterior pharyngeal erythema or exudate.  No tonsillar exudate or focal tonsillar swelling.  Intraoral exam overall unremarkable.  Eyes:      Extraocular Movements: Extraocular movements intact.      Conjunctiva/sclera: Conjunctivae normal.   Cardiovascular:      Pulses: Normal pulses.      Heart sounds: Normal heart sounds.   Pulmonary:      Effort: Pulmonary effort is normal. No respiratory distress.      Breath sounds: No wheezing.   Abdominal:      General: Abdomen is flat. There is no distension.      Tenderness: There is no abdominal tenderness.   Musculoskeletal:      Cervical back: Normal range of motion and neck supple.      Right lower leg: No tenderness. No edema.      Left lower leg: No tenderness. No edema.   Skin:     General: Skin is warm and dry.   Neurological:      General: No focal deficit present.      Mental Status: She is alert. Mental status is at baseline.   Psychiatric:         Behavior: Behavior  normal.         Thought Content: Thought content normal.         Procedures           ED Course  ED Course as of 05/09/23 1830   Tue May 09, 2023   1718 HEART score 3 [AS]      ED Course User Index  [AS] Josue Medina MD                                           [facility]   Peggy Regan is a 62 y.o. female with PMH above who presents to the Emergency Department with shortness of breath.  Symptoms are consistent with her active COVID-19 infection.  She has occasional chest pain which she feels like is a burning inspiratory pain whenever she is exerting herself otherwise does not have any chest pain.  Initial EKG shows sinus rhythm, no focal ischemic changes.  Patient is maintaining good oxygen saturations on room air.  On all of my evaluations her heart rate was less than 110 although it looks like her vital signs have not been consistently updated throughout her visit.  I have evaluated the patient 5 times since her arrival total and did not note a heart rate above 120.  She has never been hypoxic in the ER.  Did conduct cardiac work-up due to concern for ischemic changes on her EKG at the outside office however her heart score is 3 and her troponins were normal.  Other laboratory is unrevealing. -chest x-ray: no focal consolidations, no pulmonary edema, mediastinum not widened.  Pulmonary embolus was considered however the patient has not had immobility, does not have a history of blood clot, is on a blood thinner due to previous stroke, and does not have hypoxia, and her clinical findings are well explained by COVID-19 virus.  She would not benefit from Decadron remdesivir given that she is not hypoxic.  I did offer admission for observation however she would prefer to go home which I think is reasonable based on her work-up.  She does understand return precautions including any pulse ox readings less than 90, worsening shortness of breath, chest pain, or any other emergencies.      All questions  answered. Patient/family was understanding and in agreement with today's assessment and plan. The patient was monitored during their stay in the ED and dispositioned without acute event.    Electronically signed by:  Josue Medina MD 5/9/2023 18:30 CDT      Note: Dragon medical dictation software was used in the creation of this note.      Final diagnoses:   COVID-19       ED Disposition  ED Disposition     ED Disposition   Discharge    Condition   Stable    Comment   --             Radha Pepe, APRN  [address] [id number] [phone]               Medication List      No changes were made to your prescriptions during this visit.          Josue Medina MD  05/09/23 [id number]

## 2024-01-31 ENCOUNTER — TELEPHONE (OUTPATIENT)
Dept: VASCULAR SURGERY | Facility: CLINIC | Age: 64
End: 2024-01-31
Payer: COMMERCIAL

## 2024-02-01 ENCOUNTER — TELEPHONE (OUTPATIENT)
Dept: PODIATRY | Facility: CLINIC | Age: 64
End: 2024-02-01
Payer: COMMERCIAL

## 2024-02-01 DIAGNOSIS — I65.22 CAROTID STENOSIS, ASYMPTOMATIC, LEFT: Primary | ICD-10-CM

## 2024-02-01 NOTE — TELEPHONE ENCOUNTER
Patient called stating she needed to cancel her appointment and testing. Patient states her insurance will not cover the testing at [facility]. However she can have her testing done at [facility]. Appointments were canclled and order faxed to [facility].

## 2024-02-07 ENCOUNTER — TELEPHONE (OUTPATIENT)
Dept: VASCULAR SURGERY | Facility: CLINIC | Age: 64
End: 2024-02-07
Payer: COMMERCIAL

## 2024-02-08 ENCOUNTER — TRANSCRIBE ORDERS (OUTPATIENT)
Dept: ADMINISTRATIVE | Facility: HOSPITAL | Age: 64
End: 2024-02-08
Payer: COMMERCIAL

## 2024-02-08 ENCOUNTER — OFFICE VISIT (OUTPATIENT)
Dept: VASCULAR SURGERY | Facility: CLINIC | Age: 64
End: 2024-02-08
Payer: COMMERCIAL

## 2024-02-08 VITALS
BODY MASS INDEX: 33.32 KG/M2 | WEIGHT: 200 LBS | DIASTOLIC BLOOD PRESSURE: 82 MMHG | HEIGHT: 65 IN | HEART RATE: 80 BPM | OXYGEN SATURATION: 97 % | SYSTOLIC BLOOD PRESSURE: 152 MMHG

## 2024-02-08 DIAGNOSIS — I10 ESSENTIAL HYPERTENSION: ICD-10-CM

## 2024-02-08 DIAGNOSIS — R07.89 OTHER CHEST PAIN: Primary | ICD-10-CM

## 2024-02-08 DIAGNOSIS — E78.2 MIXED HYPERLIPIDEMIA: ICD-10-CM

## 2024-02-08 DIAGNOSIS — I65.23 BILATERAL CAROTID ARTERY STENOSIS: Primary | ICD-10-CM

## 2024-02-08 PROCEDURE — 99214 OFFICE O/P EST MOD 30 MIN: CPT | Performed by: NURSE PRACTITIONER

## 2024-02-08 NOTE — PROGRESS NOTES
"2/8/2024       Radha Pepe, APRN  [address] [facility] [place] [address]      Peggy Regan  1960    Chief Complaint   Patient presents with    Follow-up     1 year follow up w/ testing. Last seen 2/14/23. Patient denies any stroke type symptoms. Patient mentions passing out a lot.        Dear Radha Pepe, JEREMÍAS     HPI  I had the pleasure of seeing your patient Peggy Regan in the office today.   As you recall, Peggy Regan is a 63 y.o.  female who you are currently following for routine health maintenance.  She was having complaints of headaches, confusion, and memory loss.  She has had syncopal episodes and may have her blood pressure or her report.  She is going to be undergoing some upcoming cardiac testing.  She is maintained on aspirin Tricor, and Zocor.  She did have noninvasive testing performed today, which I did review in office.       Review of Systems   Constitutional: Negative.    HENT: Negative.     Eyes: Negative.    Respiratory:  Positive for chest tightness.    Cardiovascular: Negative.    Gastrointestinal: Negative.    Endocrine: Negative.    Genitourinary: Negative.    Musculoskeletal: Negative.    Skin: Negative.    Allergic/Immunologic: Negative.    Neurological:  Positive for syncope and light-headedness.   Hematological: Negative.    Psychiatric/Behavioral: Negative.            /82   Pulse 80   Ht 165.1 cm (65\")   Wt 90.7 kg (200 lb)   SpO2 97%   BMI 33.28 kg/m²     Physical Exam  Vitals and nursing note reviewed.   Constitutional:       Appearance: Normal appearance. She is well-developed. She is obese.   HENT:      Head: Normocephalic and atraumatic.   Eyes:      General: No scleral icterus.     Pupils: Pupils are equal, round, and reactive to light.   Neck:      Thyroid: No thyromegaly.      Vascular: No carotid bruit or JVD.   Cardiovascular:      Rate and Rhythm: Normal rate and regular rhythm.      Pulses:           Carotid pulses are 2+ on the " right side and 2+ on the left side.       Femoral pulses are 2+ on the right side and 2+ on the left side.       Popliteal pulses are 2+ on the right side and 2+ on the left side.        Dorsalis pedis pulses are 2+ on the right side and 2+ on the left side.        Posterior tibial pulses are 2+ on the right side and 2+ on the left side.      Heart sounds: Normal heart sounds.   Pulmonary:      Effort: Pulmonary effort is normal.      Breath sounds: Normal breath sounds.   Abdominal:      General: Bowel sounds are normal. There is no distension or abdominal bruit.      Palpations: Abdomen is soft. There is no mass.      Tenderness: There is no abdominal tenderness.   Musculoskeletal:         General: Normal range of motion.      Cervical back: Neck supple.   Lymphadenopathy:      Cervical: No cervical adenopathy.   Skin:     General: Skin is warm and dry.   Neurological:      Mental Status: She is alert and oriented to person, place, and time.      Cranial Nerves: No cranial nerve deficit.      Sensory: No sensory deficit.   Psychiatric:         Mood and Affect: Mood normal.         Behavior: Behavior normal.         Thought Content: Thought content normal.         Judgment: Judgment normal.       Diagnostic Data:  Noninvasive testing including a carotid duplex shows less than 50% carotid stenosis bilaterally with bilateral antegrade vertebral flow.  This was performed at [facility].       Patient Active Problem List   Diagnosis    Essential hypertension    Mixed hyperlipidemia    Shortness of breath    Fibromyalgia    Depression    DDD (degenerative disc disease), cervical    DDD (degenerative disc disease), lumbosacral    GERD (gastroesophageal reflux disease)    ANGELLA (obstructive sleep apnea)    Asthma    Syncope    S/P GIOVANNA-BSO    S/P cervical spinal fusion    S/P  section    Hx of colonic polyps    Family history of polyps in the colon    Recurrent syncope    Carotid stenosis, asymptomatic, left    Class  1 obesity with alveolar hypoventilation, serious comorbidity, and body mass index (BMI) of 32.0 to 32.9 in adult        Diagnosis Plan   1. Bilateral carotid artery stenosis  US Carotid Bilateral      2. Essential hypertension        3. Mixed hyperlipidemia              Plan: After thoroughly evaluating Peggy Regan, I believe the best course of action is to remain conservative from vascular surgery standpoint.  Overall she denies any strokelike symptoms.  She is still hide is having some syncopal episodes and will be undergoing a stress test soon.  I did review her testing which shows less than 50% carotid stenosis bilaterally.  We will see her back in 1 year with repeat noninvasive testing for continued surveillance, including a carotid duplex.  I did discuss vascular risk factors as they pertain to the progression of vascular disease including controlling her hypertension and hyperlipidemia.  Her blood pressure is elevated in office today at 152/82.  She should continue to monitor and discuss with her primary care provider.  She should continue her aspirin 81 mg daily and Zocor 40 mg daily in addition to her other medications.  This was all discussed in full with complete understanding.  Thank you for allowing me to participate in the care of your patient.  Please do not hesitate to call with any questions or concerns.  We will keep you aware of any further encounters with Peggy Regan.        Sincerely yours,         Bela Ricardo, JEREMÍAS Pepe, JEREMÍAS Peña

## 2024-02-08 NOTE — LETTER
February 8, 2024     JEREMÍAS Wilkes  [address] [address]    Patient: Peggy Regan   YOB: 1960   Date of Visit: 2/8/2024     Dear JEREMÍAS Wilkes:       Thank you for referring Peggy Regan to me for evaluation. Below are the relevant portions of my assessment and plan of care.    If you have questions, please do not hesitate to call me. I look forward to following Peggy along with you.         Sincerely,        JEREMÍAS Lopez        CC: No Recipients    Bela Ricardo APRN  02/08/24 0956  Sign when Signing Visit  2/8/2024       Radha Pepe APRN  [address] [facility] [address]      Peggy Regan  1960    Chief Complaint   Patient presents with   • Follow-up     1 year follow up w/ testing. Last seen 2/14/23. Patient denies any stroke type symptoms. Patient mentions passing out a lot.        Dear Radha Pepe APRN     HPI  I had the pleasure of seeing your patient Peggy Regan in the office today.   As you recall, Peggy Regan is a 63 y.o.  female who you are currently following for routine health maintenance.  She was having complaints of headaches, confusion, and memory loss.  She has had syncopal episodes and may have her blood pressure or her report.  She is going to be undergoing some upcoming cardiac testing.  She is maintained on aspirin Tricor, and Zocor.  She did have noninvasive testing performed today, which I did review in office.       Review of Systems   Constitutional: Negative.    HENT: Negative.     Eyes: Negative.    Respiratory:  Positive for chest tightness.    Cardiovascular: Negative.    Gastrointestinal: Negative.    Endocrine: Negative.    Genitourinary: Negative.    Musculoskeletal: Negative.    Skin: Negative.    Allergic/Immunologic: Negative.    Neurological:  Positive for syncope and light-headedness.   Hematological: Negative.    Psychiatric/Behavioral: Negative.            /82    "Pulse 80   Ht 165.1 cm (65\")   Wt 90.7 kg (200 lb)   SpO2 97%   BMI 33.28 kg/m²     Physical Exam  Vitals and nursing note reviewed.   Constitutional:       Appearance: Normal appearance. She is well-developed. She is obese.   HENT:      Head: Normocephalic and atraumatic.   Eyes:      General: No scleral icterus.     Pupils: Pupils are equal, round, and reactive to light.   Neck:      Thyroid: No thyromegaly.      Vascular: No carotid bruit or JVD.   Cardiovascular:      Rate and Rhythm: Normal rate and regular rhythm.      Pulses:           Carotid pulses are 2+ on the right side and 2+ on the left side.       Femoral pulses are 2+ on the right side and 2+ on the left side.       Popliteal pulses are 2+ on the right side and 2+ on the left side.        Dorsalis pedis pulses are 2+ on the right side and 2+ on the left side.        Posterior tibial pulses are 2+ on the right side and 2+ on the left side.      Heart sounds: Normal heart sounds.   Pulmonary:      Effort: Pulmonary effort is normal.      Breath sounds: Normal breath sounds.   Abdominal:      General: Bowel sounds are normal. There is no distension or abdominal bruit.      Palpations: Abdomen is soft. There is no mass.      Tenderness: There is no abdominal tenderness.   Musculoskeletal:         General: Normal range of motion.      Cervical back: Neck supple.   Lymphadenopathy:      Cervical: No cervical adenopathy.   Skin:     General: Skin is warm and dry.   Neurological:      Mental Status: She is alert and oriented to person, place, and time.      Cranial Nerves: No cranial nerve deficit.      Sensory: No sensory deficit.   Psychiatric:         Mood and Affect: Mood normal.         Behavior: Behavior normal.         Thought Content: Thought content normal.         Judgment: Judgment normal.       Diagnostic Data:  Noninvasive testing including a carotid duplex shows less than 50% carotid stenosis bilaterally with bilateral antegrade vertebral " flow.  This was performed at [facility].       Patient Active Problem List   Diagnosis   • Essential hypertension   • Mixed hyperlipidemia   • Shortness of breath   • Fibromyalgia   • Depression   • DDD (degenerative disc disease), cervical   • DDD (degenerative disc disease), lumbosacral   • GERD (gastroesophageal reflux disease)   • ANGELLA (obstructive sleep apnea)   • Asthma   • Syncope   • S/P GIOVANNA-BSO   • S/P cervical spinal fusion   • S/P  section   • Hx of colonic polyps   • Family history of polyps in the colon   • Recurrent syncope   • Carotid stenosis, asymptomatic, left   • Class 1 obesity with alveolar hypoventilation, serious comorbidity, and body mass index (BMI) of 32.0 to 32.9 in adult        Diagnosis Plan   1. Bilateral carotid artery stenosis  US Carotid Bilateral      2. Essential hypertension        3. Mixed hyperlipidemia              Plan: After thoroughly evaluating Peggy Regan, I believe the best course of action is to remain conservative from vascular surgery standpoint.  Overall she denies any strokelike symptoms.  She is still hide is having some syncopal episodes and will be undergoing a stress test soon.  I did review her testing which shows less than 50% carotid stenosis bilaterally.  We will see her back in 1 year with repeat noninvasive testing for continued surveillance, including a carotid duplex.  I did discuss vascular risk factors as they pertain to the progression of vascular disease including controlling her hypertension and hyperlipidemia.  Her blood pressure is elevated in office today at 152/82.  She should continue to monitor and discuss with her primary care provider.  She should continue her aspirin 81 mg daily and Zocor 40 mg daily in addition to her other medications.  This was all discussed in full with complete understanding.  Thank you for allowing me to participate in the care of your patient.  Please do not hesitate to call with any questions or  concerns.  We will keep you aware of any further encounters with Peggy Regan.        Sincerely yours,         Bela Ricardo, JEREMÍAS Pepe, JEREMÍAS Peña

## 2024-02-16 ENCOUNTER — HOSPITAL ENCOUNTER (OUTPATIENT)
Dept: CARDIOLOGY | Facility: HOSPITAL | Age: 64
Discharge: HOME | End: 2024-02-16
Payer: COMMERCIAL

## 2024-02-16 VITALS
WEIGHT: 200 LBS | SYSTOLIC BLOOD PRESSURE: 165 MMHG | BODY MASS INDEX: 33.32 KG/M2 | HEART RATE: 89 BPM | DIASTOLIC BLOOD PRESSURE: 87 MMHG | HEIGHT: 65 IN

## 2024-02-16 DIAGNOSIS — R07.89 OTHER CHEST PAIN: ICD-10-CM

## 2024-02-16 LAB
BH CV STRESS BP STAGE 1: NORMAL
BH CV STRESS BP STAGE 2: NORMAL
BH CV STRESS DURATION MIN STAGE 1: 3
BH CV STRESS DURATION MIN STAGE 2: 1
BH CV STRESS DURATION SEC STAGE 1: 0
BH CV STRESS DURATION SEC STAGE 2: 44
BH CV STRESS GRADE STAGE 1: 10
BH CV STRESS GRADE STAGE 2: 12
BH CV STRESS HR STAGE 1: 125
BH CV STRESS HR STAGE 2: 136
BH CV STRESS METS STAGE 1: 5
BH CV STRESS METS STAGE 2: 7.5
BH CV STRESS PROTOCOL 1: NORMAL
BH CV STRESS RECOVERY BP: NORMAL MMHG
BH CV STRESS RECOVERY HR: 87 BPM
BH CV STRESS SPEED STAGE 1: 1.7
BH CV STRESS SPEED STAGE 2: 2.5
BH CV STRESS STAGE 1: 1
BH CV STRESS STAGE 2: 2
MAXIMAL PREDICTED HEART RATE: 157 BPM
PERCENT MAX PREDICTED HR: 86.62 %
STRESS BASELINE BP: NORMAL MMHG
STRESS BASELINE HR: 85 BPM
STRESS PERCENT HR: 102 %
STRESS POST ESTIMATED WORKLOAD: 7.5 METS
STRESS POST EXERCISE DUR MIN: 4 MIN
STRESS POST EXERCISE DUR SEC: 44 SEC
STRESS POST PEAK BP: NORMAL MMHG
STRESS POST PEAK HR: 136 BPM
STRESS TARGET HR: 133 BPM

## 2024-02-16 PROCEDURE — 93350 STRESS TTE ONLY: CPT

## 2024-02-16 PROCEDURE — 25510000001 PERFLUTREN 6.52 MG/ML SUSPENSION: Performed by: INTERNAL MEDICINE

## 2024-02-16 PROCEDURE — 93017 CV STRESS TEST TRACING ONLY: CPT

## 2024-02-16 RX ADMIN — PERFLUTREN 13.04 MG: 6.52 INJECTION, SUSPENSION INTRAVENOUS at 11:08

## 2024-05-14 ENCOUNTER — OFFICE VISIT (OUTPATIENT)
Dept: GASTROENTEROLOGY | Facility: CLINIC | Age: 64
End: 2024-05-14
Payer: COMMERCIAL

## 2024-05-14 VITALS
HEART RATE: 86 BPM | BODY MASS INDEX: 32.49 KG/M2 | OXYGEN SATURATION: 97 % | SYSTOLIC BLOOD PRESSURE: 128 MMHG | DIASTOLIC BLOOD PRESSURE: 78 MMHG | WEIGHT: 195 LBS | HEIGHT: 65 IN | TEMPERATURE: 97.7 F

## 2024-05-14 DIAGNOSIS — Z86.010 HX OF ADENOMATOUS COLONIC POLYPS: Primary | ICD-10-CM

## 2024-05-14 DIAGNOSIS — Z83.719 FAMILY HISTORY OF POLYPS IN THE COLON: ICD-10-CM

## 2024-05-14 PROBLEM — Z86.0101 HX OF ADENOMATOUS COLONIC POLYPS: Status: ACTIVE | Noted: 2021-03-15

## 2024-05-14 PROCEDURE — S0285 CNSLT BEFORE SCREEN COLONOSC: HCPCS | Performed by: NURSE PRACTITIONER

## 2024-05-14 RX ORDER — BUDESONIDE AND FORMOTEROL FUMARATE DIHYDRATE 160; 4.5 UG/1; UG/1
1 AEROSOL RESPIRATORY (INHALATION)
COMMUNITY
Start: 2024-02-07

## 2024-05-14 RX ORDER — POLYETHYLENE GLYCOL 3350 17 G/17G
17 POWDER, FOR SOLUTION ORAL AS NEEDED
COMMUNITY

## 2024-05-14 RX ORDER — DOCUSATE SODIUM 100 MG/1
100 CAPSULE, LIQUID FILLED ORAL AS NEEDED
COMMUNITY

## 2024-05-14 RX ORDER — SODIUM, POTASSIUM,MAG SULFATES 17.5-3.13G
1 SOLUTION, RECONSTITUTED, ORAL ORAL EVERY 12 HOURS
Qty: 354 ML | Refills: 0 | Status: SHIPPED | OUTPATIENT
Start: 2024-05-14

## 2024-05-14 NOTE — PROGRESS NOTES
Primary Physician: Radha Pepe APRN    Chief Complaint   Patient presents with    Colon Cancer Screening     Pt presents today for colon recall-last colon was 4/28/2021; Personal history of adenomatous and hyperplastic polyps; Family history of colon polyps       Subjective     Peggy Regan is a 63 y.o. female.    HPI  Personal history of adenomatous colon polyps  4/28/2021 colonoscopy multiple polyps resected from ascending colon, descending colon, and transverse colon.  All adenomatous in nature.    Patient denies any change in bowel habits.  No diarrhea, abdominal pain or rectal bleeding to report.  She does have chronic constipation. She will use OTC laxatives such as miralax or even senna. If she takes a stool softener daily she will have a daily BM.    NO Family history of colon cancer to report.    Family history of colon polyps  Father had colon polyps and mother had colon polyps both age greater than 60.    Past Medical History:   Diagnosis Date    Arthritis     Asthma     Carotid artery occlusion 01/31/2023    Chronic kidney disease, stage 3a     Cough     Multifactorial and adversely affected by shy and obesity    Degenerative disc disease, lumbar     Degenerative disc disease, thoracic     Depression     Family history of colonic polyps     Fibromyalgia, primary     Functional dyspepsia     GERD (gastroesophageal reflux disease)     Head injury 01/2023    Showed on mri    Headache, tension-type     Years I have had HA    History of adenomatous polyp of colon     History of colon polyps     History of squamous cell carcinoma 11/29/2021    Hypercholesteremia     Hypertension     Memory loss 12/2022    I think memory going a couple years ago    Sleep apnea     CPAP    Spinal headache     Syncope     Type 2 diabetes mellitus     Vision loss     Wear glasses    Wears glasses        Past Surgical History:   Procedure Laterality Date    ANTERIOR CERVICAL FUSION  09/09/2016    BRAVO PROCEDURE   2011    Borderline test while off PPI; Day 1 suggestive of reflux disease and Day 2 is negative     SECTION      X2    CHOLECYSTECTOMY      COLONOSCOPY  10/29/2015    One 5mm polyp in the sigmoid colon-complete resection, polyp tissue not retrieved; Examination was otherwise normal on direct and retroflexion views; Repeat 5 years     COLONOSCOPY  2003    Normal; Repeat at age 50    COLONOSCOPY  2010    Internal hemorrhoids; One 1cm polyp in the descending colon-path shows benign submucosal leiomyoma; Repeat 3 years    COLONOSCOPY  1992    Dr. Arredondo-Rox colonoscopy    COLONOSCOPY N/A 2021    One 5mm adenomatous polyp in the ascending colon; One 4mm adenomatous polyp in the transverse colon; Three 4-6mm polyps in the descending colon-path shows two adenomatous and one hyperplastic polyp; The examination was otherwise normal on direct and retroflexion views; Repeat 3 years    ENDOSCOPY  2011    Normal esophagus; Normal stomach; Normal examined duodenum; BRAVO capsule placed     ENDOSCOPY  2010    Suboptimal view due to retained gastric contents; Will check SPGE scan; Will repeat endo/MAC with BRAVO replacement    ENDOSCOPY  2003    Normal; RTC 3 months    ENDOSCOPY  1994    Dr. Morales endoscopy; Slant taken for H.Pylori    HYSTERECTOMY      KNEE ARTHROSCOPY      VENTRAL/INCISIONAL HERNIA REPAIR N/A 2016    Procedure: VENTRAL/INCISIONAL HERNIA REPAIR WITH MESH;  Surgeon: Mckenna Adair MD;  Location: [facility];  Service:         Current Outpatient Medications:     aspirin 81 MG EC tablet, Take 1 tablet by mouth Daily., Disp: , Rfl:     buPROPion XL (WELLBUTRIN XL) 150 MG 24 hr tablet, Take 1 tablet by mouth Daily., Disp: , Rfl:     busPIRone (BUSPAR) 5 MG tablet, Take 1 tablet by mouth 2 (Two) Times a Day., Disp: , Rfl:     cyclobenzaprine (FLEXERIL) 10 MG tablet, Take 1 tablet by mouth 2 (Two) Times a Day As Needed for Muscle Spasms., Disp: ,  Rfl:     docusate sodium (COLACE) 100 MG capsule, Take 1 capsule by mouth As Needed for Constipation., Disp: , Rfl:     DULoxetine (CYMBALTA) 60 MG capsule, Take 2 capsules by mouth Daily., Disp: , Rfl:     fenofibrate (TRICOR) 145 MG tablet, Take 1 tablet by mouth Daily., Disp: , Rfl:     losartan (COZAAR) 25 MG tablet, Take 1 tablet by mouth Daily., Disp: 90 tablet, Rfl: 0    montelukast (SINGULAIR) 10 MG tablet, Take 1 tablet by mouth Daily., Disp: , Rfl:     pantoprazole (PROTONIX) 40 MG EC tablet, Take 1 tablet by mouth 2 (Two) Times a Day., Disp: , Rfl:     polyethylene glycol (MiraLax) 17 g packet, Take 17 g by mouth As Needed., Disp: , Rfl:     pregabalin (LYRICA) 100 MG capsule, Take 1 capsule by mouth 2 (Two) Times a Day. (Patient taking differently: Take 1 capsule by mouth 3 times a day.), Disp: 60 capsule, Rfl: 0    simvastatin (ZOCOR) 40 MG tablet, Take 1 tablet by mouth Daily., Disp: , Rfl:     sodium bicarbonate 325 MG tablet, Take 1 tablet by mouth 2 (Two) Times a Day., Disp: , Rfl:     Symbicort 160-4.5 MCG/ACT inhaler, Inhale 1 puff 2 (Two) Times a Day., Disp: , Rfl:     vitamin D (ERGOCALCIFEROL) 1.25 MG (34640 UT) capsule capsule, Take 1 capsule by mouth 1 (One) Time Per Week., Disp: , Rfl:     sodium-potassium-magnesium sulfates (Suprep Bowel Prep Kit) 17.5-3.13-1.6 GM/177ML solution oral solution, Take 1 bottle by mouth Every 12 (Twelve) Hours., Disp: 354 mL, Rfl: 0    Allergies   Allergen Reactions    Tetracyclines & Related Swelling     Mouth swelling    Erythromycin Nausea And Vomiting    Penicillins Hives       Social History     Socioeconomic History    Marital status:    Tobacco Use    Smoking status: Never    Smokeless tobacco: Never   Vaping Use    Vaping status: Never Used   Substance and Sexual Activity    Alcohol use: Never    Drug use: Never    Sexual activity: Not Currently     Partners: Male     Birth control/protection: Surgical, Post-menopausal, Hysterectomy       Family  "History   Problem Relation Age of Onset    Heart disease Mother     Colon polyps Mother         > 60 years of age     Dementia Mother     Stroke Mother     Colon polyps Father         > 60 years of age     Hypertension Sister     Migraines Sister     Neuropathy Sister         Trigeminal neuralgia    Colon cancer Neg Hx     Esophageal cancer Neg Hx     Liver cancer Neg Hx     Liver disease Neg Hx     Rectal cancer Neg Hx     Stomach cancer Neg Hx        Review of Systems   Constitutional:  Negative for unexpected weight change.   Respiratory:  Negative for shortness of breath.    Cardiovascular:  Negative for chest pain.       Objective     /78 (BP Location: Left arm, Patient Position: Sitting, Cuff Size: Adult)   Pulse 86   Temp 97.7 °F (36.5 °C) (Infrared)   Ht 165.1 cm (65\")   Wt 88.5 kg (195 lb)   SpO2 97%   Breastfeeding No   BMI 32.45 kg/m²     Physical Exam  Vitals reviewed.   Constitutional:       Appearance: Normal appearance.   Cardiovascular:      Rate and Rhythm: Normal rate and regular rhythm.      Heart sounds: Normal heart sounds.   Pulmonary:      Effort: Pulmonary effort is normal.      Breath sounds: Normal breath sounds.   Neurological:      Mental Status: She is alert.         Lab Results - Last 18 Months   Lab Units 05/09/23  1555 02/19/23  1756   GLUCOSE mg/dL 101* 146*   BUN mg/dL 15 16   CREATININE mg/dL 1.00 0.89   SODIUM mmol/L 138 140   POTASSIUM mmol/L 4.0 3.7   CHLORIDE mmol/L 101 103   CO2 mmol/L 21.0* 23.0   TOTAL PROTEIN g/dL  --  7.6   ALBUMIN g/dL  --  4.5   ALT (SGPT) U/L  --  10   AST (SGOT) U/L  --  21   ALK PHOS U/L  --  47   BILIRUBIN mg/dL  --  0.3   GLOBULIN gm/dL  --  3.1       Lab Results - Last 18 Months   Lab Units 05/09/23  1555 02/19/23  1756   HEMOGLOBIN g/dL 11.9* 11.7*   HEMATOCRIT % 37.1 34.9   MCV fL 86.9 84.9   WBC 10*3/mm3 9.59 4.62   RDW % 13.6 12.7   MPV fL 9.6 9.9   PLATELETS 10*3/mm3 334 204       Lab Results - Last 18 Months   Lab Units " 02/19/23  1756   TSH uIU/mL 1.950          IMPRESSION/PLAN:    Assessment & Plan      Problem List Items Addressed This Visit       Hx of adenomatous colonic polyps - Primary    Overview     4/28/2021 colonoscopy multiple polyps resected from ascending colon, descending colon, and transverse colon.  All adenomatous in nature.         Relevant Medications    sodium-potassium-magnesium sulfates (Suprep Bowel Prep Kit) 17.5-3.13-1.6 GM/177ML solution oral solution    Other Relevant Orders    Case Request (Completed)    Family history of polyps in the colon    Overview     Mother & Father > 60          Colonoscopy per Dr. Zuñiga  Suprep Prep (5/9/2023 GFR 63.8) Will call kidney dr for most recent labs done earlier this year          ..The risks, benefits, and alternatives of colonoscopy were reviewed with the patient today.  Risks including perforation of the colon possibly requiring surgery or colostomy.  Additional risks include risk of bleeding from biopsies or removal of colon tissue.  There is also the risk of a drug reaction or problems with anesthesia.  This will be discussed with the further by the anesthesia team on the day of the procedure.  Lastly there is a possibility of missing a colon polyp or cancer.  The benefits include the diagnosis and management of disease of the colon and rectum.  Alternatives to colonoscopy include barium enema, laboratory testing, radiographic evaluation, or no intervention.  The patient verbalizes understanding and agrees.    In accordance with requirements under the Affordable Care Act, [facility] has provided pricing for all hospital services and items on each of its websites. However, a patient's actual cost may differ based on the services the patient receives to meet individual healthcare needs and based on the benefits provided under the patient’s insurance coverage.        Ruth Andrews, APRN  05/14/24  09:59 CDT    Part of this note may be an electronic  transcription/translation of spoken language to printed text.

## 2024-06-18 ENCOUNTER — TELEPHONE (OUTPATIENT)
Dept: GASTROENTEROLOGY | Facility: CLINIC | Age: 64
End: 2024-06-18
Payer: COMMERCIAL

## 2024-07-31 ENCOUNTER — ANESTHESIA (OUTPATIENT)
Dept: GASTROENTEROLOGY | Facility: HOSPITAL | Age: 64
End: 2024-07-31
Payer: COMMERCIAL

## 2024-07-31 ENCOUNTER — ANESTHESIA EVENT (OUTPATIENT)
Dept: GASTROENTEROLOGY | Facility: HOSPITAL | Age: 64
End: 2024-07-31
Payer: COMMERCIAL

## 2024-07-31 ENCOUNTER — HOSPITAL ENCOUNTER (OUTPATIENT)
Facility: HOSPITAL | Age: 64
Setting detail: HOSPITAL OUTPATIENT SURGERY
Discharge: HOME | End: 2024-07-31
Attending: INTERNAL MEDICINE | Admitting: INTERNAL MEDICINE
Payer: COMMERCIAL

## 2024-07-31 VITALS
SYSTOLIC BLOOD PRESSURE: 129 MMHG | TEMPERATURE: 97.1 F | OXYGEN SATURATION: 99 % | RESPIRATION RATE: 18 BRPM | BODY MASS INDEX: 33.66 KG/M2 | DIASTOLIC BLOOD PRESSURE: 72 MMHG | WEIGHT: 202 LBS | HEIGHT: 65 IN | HEART RATE: 75 BPM

## 2024-07-31 DIAGNOSIS — Z86.010 HX OF ADENOMATOUS COLONIC POLYPS: ICD-10-CM

## 2024-07-31 PROCEDURE — 88305 TISSUE EXAM BY PATHOLOGIST: CPT | Performed by: INTERNAL MEDICINE

## 2024-07-31 PROCEDURE — 25010000002 PROPOFOL 10 MG/ML EMULSION: Performed by: NURSE ANESTHETIST, CERTIFIED REGISTERED

## 2024-07-31 PROCEDURE — 45385 COLONOSCOPY W/LESION REMOVAL: CPT | Performed by: INTERNAL MEDICINE

## 2024-07-31 PROCEDURE — 25810000003 SODIUM CHLORIDE 0.9 % SOLUTION: Performed by: ANESTHESIOLOGY

## 2024-07-31 RX ORDER — LIDOCAINE HYDROCHLORIDE 20 MG/ML
INJECTION, SOLUTION EPIDURAL; INFILTRATION; INTRACAUDAL; PERINEURAL AS NEEDED
Status: DISCONTINUED | OUTPATIENT
Start: 2024-07-31 | End: 2024-07-31 | Stop reason: SURG

## 2024-07-31 RX ORDER — SODIUM CHLORIDE 0.9 % (FLUSH) 0.9 %
10 SYRINGE (ML) INJECTION AS NEEDED
Status: DISCONTINUED | OUTPATIENT
Start: 2024-07-31 | End: 2024-07-31 | Stop reason: HOSPADM

## 2024-07-31 RX ORDER — SODIUM CHLORIDE 9 MG/ML
100 INJECTION, SOLUTION INTRAVENOUS CONTINUOUS
Status: DISCONTINUED | OUTPATIENT
Start: 2024-07-31 | End: 2024-07-31 | Stop reason: HOSPADM

## 2024-07-31 RX ORDER — SODIUM CHLORIDE 0.9 % (FLUSH) 0.9 %
10 SYRINGE (ML) INJECTION EVERY 12 HOURS SCHEDULED
Status: DISCONTINUED | OUTPATIENT
Start: 2024-07-31 | End: 2024-07-31 | Stop reason: HOSPADM

## 2024-07-31 RX ORDER — PROPOFOL 10 MG/ML
VIAL (ML) INTRAVENOUS AS NEEDED
Status: DISCONTINUED | OUTPATIENT
Start: 2024-07-31 | End: 2024-07-31 | Stop reason: SURG

## 2024-07-31 RX ORDER — SODIUM CHLORIDE 9 MG/ML
40 INJECTION, SOLUTION INTRAVENOUS AS NEEDED
Status: CANCELLED | OUTPATIENT
Start: 2024-07-31

## 2024-07-31 RX ADMIN — LIDOCAINE HYDROCHLORIDE 50 MG: 20 INJECTION, SOLUTION EPIDURAL; INFILTRATION; INTRACAUDAL; PERINEURAL at 10:21

## 2024-07-31 RX ADMIN — PROPOFOL 250 MG: 10 INJECTION, EMULSION INTRAVENOUS at 10:21

## 2024-07-31 RX ADMIN — SODIUM CHLORIDE 100 ML/HR: 9 INJECTION, SOLUTION INTRAVENOUS at 09:41

## 2024-07-31 NOTE — H&P
Chief Complaint:   Colon polyps    Subjective     HPI:   Multiple adenomas removed 2021.    Past Medical History:   Past Medical History:   Diagnosis Date    Arthritis     Asthma     Cancer     skin ca    Carotid artery occlusion 2023    Chronic kidney disease, stage 3a     Cough     Multifactorial and adversely affected by shy and obesity    Degenerative disc disease, lumbar     Degenerative disc disease, thoracic     Depression     Family history of colonic polyps     Fibromyalgia, primary     Functional dyspepsia     GERD (gastroesophageal reflux disease)     Head injury 2023    Showed on mri    Headache, tension-type     Years I have had HA    History of adenomatous polyp of colon     History of colon polyps     History of squamous cell carcinoma 2021    Hypercholesteremia     Hypertension     Memory loss 2022    I think memory going a couple years ago    Sleep apnea     CPAP    Spinal headache     Syncope     Vision loss     Wear glasses    Wears glasses        Past Surgical History:  Past Surgical History:   Procedure Laterality Date    ANTERIOR CERVICAL FUSION  2016    BRAVO PROCEDURE  2011    Borderline test while off PPI; Day 1 suggestive of reflux disease and Day 2 is negative     SECTION      X2    CHOLECYSTECTOMY      COLONOSCOPY  10/29/2015    One 5mm polyp in the sigmoid colon-complete resection, polyp tissue not retrieved; Examination was otherwise normal on direct and retroflexion views; Repeat 5 years     COLONOSCOPY  2003    Normal; Repeat at age 50    COLONOSCOPY  2010    Internal hemorrhoids; One 1cm polyp in the descending colon-path shows benign submucosal leiomyoma; Repeat 3 years    COLONOSCOPY  1992    Dr. Arredondo-Normal colonoscopy    COLONOSCOPY N/A 2021    One 5mm adenomatous polyp in the ascending colon; One 4mm adenomatous polyp in the transverse colon; Three 4-6mm polyps in the descending colon-path shows two adenomatous  and one hyperplastic polyp; The examination was otherwise normal on direct and retroflexion views; Repeat 3 years    ENDOSCOPY  01/11/2011    Normal esophagus; Normal stomach; Normal examined duodenum; BRAVO capsule placed     ENDOSCOPY  12/01/2010    Suboptimal view due to retained gastric contents; Will check SPGE scan; Will repeat endo/MAC with BRAVO replacement    ENDOSCOPY  08/18/2003    Normal; RTC 3 months    ENDOSCOPY  09/22/1994    Dr. Arredondo-Normal endoscopy; Slant taken for H.Pylori    HYSTERECTOMY      KNEE ARTHROSCOPY      VENTRAL/INCISIONAL HERNIA REPAIR N/A 12/28/2016    Procedure: VENTRAL/INCISIONAL HERNIA REPAIR WITH MESH;  Surgeon: Mckenna Adair MD;  Location: [facility] OR;  Service:         Family History:  Family History   Problem Relation Age of Onset    Heart disease Mother     Colon polyps Mother         > 60 years of age     Dementia Mother     Stroke Mother     Colon polyps Father         > 60 years of age     Hypertension Sister     Migraines Sister     Neuropathy Sister         Trigeminal neuralgia    Colon cancer Neg Hx     Esophageal cancer Neg Hx     Liver cancer Neg Hx     Liver disease Neg Hx     Rectal cancer Neg Hx     Stomach cancer Neg Hx        Social History:   reports that she has never smoked. She has never used smokeless tobacco. She reports that she does not drink alcohol and does not use drugs.    Medications:   Medications Prior to Admission   Medication Sig Dispense Refill Last Dose    aspirin 81 MG EC tablet Take 1 tablet by mouth Daily.   7/30/2024    buPROPion XL (WELLBUTRIN XL) 150 MG 24 hr tablet Take 1 tablet by mouth Daily.   7/30/2024    busPIRone (BUSPAR) 5 MG tablet Take 1 tablet by mouth 2 (Two) Times a Day.   7/30/2024    cyclobenzaprine (FLEXERIL) 10 MG tablet Take 1 tablet by mouth 2 (Two) Times a Day As Needed for Muscle Spasms.   7/30/2024    DULoxetine (CYMBALTA) 60 MG capsule Take 2 capsules by mouth Daily.   7/30/2024    fenofibrate (TRICOR) 145 MG tablet  "Take 1 tablet by mouth Daily.   7/30/2024    losartan (COZAAR) 25 MG tablet Take 1 tablet by mouth Daily. 90 tablet 0 7/31/2024    montelukast (SINGULAIR) 10 MG tablet Take 1 tablet by mouth Daily.   7/30/2024    pantoprazole (PROTONIX) 40 MG EC tablet Take 1 tablet by mouth 2 (Two) Times a Day.   7/30/2024    pregabalin (LYRICA) 100 MG capsule Take 1 capsule by mouth 2 (Two) Times a Day. (Patient taking differently: Take 1 capsule by mouth 3 times a day.) 60 capsule 0 7/30/2024    simvastatin (ZOCOR) 40 MG tablet Take 1 tablet by mouth Daily.   7/30/2024    sodium bicarbonate 325 MG tablet Take 1 tablet by mouth 2 (Two) Times a Day.   7/30/2024    sodium-potassium-magnesium sulfates (Suprep Bowel Prep Kit) 17.5-3.13-1.6 GM/177ML solution oral solution Take 1 bottle by mouth Every 12 (Twelve) Hours. 354 mL 0 7/31/2024    docusate sodium (COLACE) 100 MG capsule Take 1 capsule by mouth As Needed for Constipation.   7/28/2024    polyethylene glycol (MiraLax) 17 g packet Take 17 g by mouth As Needed.   7/29/2024    Symbicort 160-4.5 MCG/ACT inhaler Inhale 1 puff 2 (Two) Times a Day.   More than a month    vitamin D (ERGOCALCIFEROL) 1.25 MG (98026 UT) capsule capsule Take 1 capsule by mouth 1 (One) Time Per Week.   7/26/2024       Allergies:  Tetracyclines & related, Erythromycin, and Penicillins    ROS:    Resp: No SOA  Cardiovascular: No CP      Objective     /78 (Patient Position: Sitting)   Pulse 90   Temp 97.1 °F (36.2 °C) (Temporal)   Resp 20   Ht 165.1 cm (65\")   Wt 91.6 kg (202 lb)   SpO2 99%   BMI 33.61 kg/m²     Physical Exam   Constitutional: Patient is oriented to person, place, and in no distress.  Pulmonary/Chest: No distress.  No audible wheezes  Psychiatric: Mood, memory, affect and judgment appear normal.     Assessment & Plan     Diagnosis:  Colon polyps    Anticipated Surgical Procedure:  Colonoscopy    The risks, benefits, and alternatives of colonoscopy were reviewed with the patient " today.  Risks including perforation of the colon possibly requiring surgery or colostomy.  Additional risks include risk of bleeding from biopsies or removal of colon tissue.  There is also the risk of a drug reaction or problems with anesthesia.  This will be discussed with the patient further by the anesthesia team on the day of the procedure.  Lastly there is a possibility of missing a colon polyp or cancer.  The benefits include the diagnosis and management of disease of the colon and rectum.  Alternatives to colonoscopy include barium enema, laboratory testing, radiographic evaluation, or no intervention.  The patient verbalizes understanding and agrees.        Please note that portions of this note were completed with a voice recognition program.

## 2024-07-31 NOTE — ANESTHESIA POSTPROCEDURE EVALUATION
"Patient: Peggy Regan    Procedure Summary       Date: 07/31/24 Room / Location: [facility] ENDOSCOPY 2 /  [facility]    Anesthesia Start: 1020 Anesthesia Stop: 1055    Procedure: COLONOSCOPY WITH ANESTHESIA Diagnosis:       Hx of adenomatous colonic polyps      (Hx of adenomatous colonic polyps [Z86.010])    Surgeons: Peggy Zuñiga MD Provider: Cale See CRNA    Anesthesia Type: MAC ASA Status: 3            Anesthesia Type: MAC    Vitals  Vitals Value Taken Time   /69 07/31/24 1056   Temp     Pulse 76 07/31/24 1057   Resp 13 07/31/24 1050   SpO2 99 % 07/31/24 1057   Vitals shown include unfiled device data.        Post Anesthesia Care and Evaluation    Patient location during evaluation: PACU  Patient participation: complete - patient participated  Level of consciousness: awake and alert  Pain management: adequate    Airway patency: patent  Anesthetic complications: No anesthetic complications    Cardiovascular status: acceptable  Respiratory status: acceptable  Hydration status: acceptable    Comments: Blood pressure 98/64, pulse 80, temperature 97.1 °F (36.2 °C), temperature source Temporal, resp. rate 13, height 165.1 cm (65\"), weight 91.6 kg (202 lb), SpO2 99%, not currently breastfeeding.    Pt discharged from PACU based on jasson score >8    "

## 2024-07-31 NOTE — ANESTHESIA PREPROCEDURE EVALUATION
Anesthesia Evaluation     Patient summary reviewed   no history of anesthetic complications:   NPO Solid Status: > 8 hours             Airway   Mallampati: II  TM distance: >3 FB  Neck ROM: full  Dental      Pulmonary    (+) ,sleep apnea on CPAP  Cardiovascular   Exercise tolerance: good (4-7 METS)    (+) hypertension, hyperlipidemia,  carotid artery disease  (-) past MI, cardiac stents, CABG      Neuro/Psych- negative ROS  GI/Hepatic/Renal/Endo    (+) obesity, GERD, renal disease- CRI, diabetes mellitus    Musculoskeletal     Abdominal    Substance History      OB/GYN          Other      history of cancer                      Anesthesia Plan    ASA 3     MAC     (Negative stress, but poor functional status )    Anesthetic plan, risks, benefits, and alternatives have been provided, discussed and informed consent has been obtained with: patient.

## 2024-08-01 LAB
CYTO UR: NORMAL
LAB AP CASE REPORT: NORMAL
Lab: NORMAL
PATH REPORT.FINAL DX SPEC: NORMAL
PATH REPORT.GROSS SPEC: NORMAL

## 2024-08-08 ENCOUNTER — TRANSCRIBE ORDERS (OUTPATIENT)
Dept: ADMINISTRATIVE | Facility: HOSPITAL | Age: 64
End: 2024-08-08
Payer: COMMERCIAL

## 2024-08-08 DIAGNOSIS — R94.31 ABNORMAL EKG: Primary | ICD-10-CM

## 2024-09-26 ENCOUNTER — HOSPITAL ENCOUNTER (OUTPATIENT)
Dept: CARDIOLOGY | Facility: HOSPITAL | Age: 64
Discharge: HOME | End: 2024-09-26
Admitting: NURSE PRACTITIONER
Payer: COMMERCIAL

## 2024-09-26 VITALS
DIASTOLIC BLOOD PRESSURE: 72 MMHG | HEIGHT: 65 IN | SYSTOLIC BLOOD PRESSURE: 129 MMHG | WEIGHT: 202.82 LBS | BODY MASS INDEX: 33.79 KG/M2

## 2024-09-26 DIAGNOSIS — R94.31 ABNORMAL EKG: ICD-10-CM

## 2024-09-26 LAB
BH CV ECHO LEFT VENTRICLE GLOBAL LONGITUDINAL STRAIN: -18 %
BH CV ECHO MEAS - AO MAX PG: 8.3 MMHG
BH CV ECHO MEAS - AO MEAN PG: 4.9 MMHG
BH CV ECHO MEAS - AO ROOT DIAM: 3.2 CM
BH CV ECHO MEAS - AO V2 MAX: 144.3 CM/SEC
BH CV ECHO MEAS - AO V2 VTI: 31.7 CM
BH CV ECHO MEAS - AVA(I,D): 2.37 CM2
BH CV ECHO MEAS - EDV(CUBED): 55.7 ML
BH CV ECHO MEAS - EDV(MOD-SP2): 48.2 ML
BH CV ECHO MEAS - EDV(MOD-SP4): 67.5 ML
BH CV ECHO MEAS - EF(MOD-SP2): 71.7 %
BH CV ECHO MEAS - EF(MOD-SP4): 67.1 %
BH CV ECHO MEAS - ESV(CUBED): 21.2 ML
BH CV ECHO MEAS - ESV(MOD-SP2): 13.6 ML
BH CV ECHO MEAS - ESV(MOD-SP4): 22.2 ML
BH CV ECHO MEAS - FS: 27.5 %
BH CV ECHO MEAS - IVS/LVPW: 1.32 CM
BH CV ECHO MEAS - IVSD: 1.29 CM
BH CV ECHO MEAS - LA DIMENSION: 3 CM
BH CV ECHO MEAS - LAT PEAK E' VEL: 9 CM/SEC
BH CV ECHO MEAS - LV DIASTOLIC VOL/BSA (35-75): 34 CM2
BH CV ECHO MEAS - LV MASS(C)D: 141.7 GRAMS
BH CV ECHO MEAS - LV MAX PG: 6 MMHG
BH CV ECHO MEAS - LV MEAN PG: 3.5 MMHG
BH CV ECHO MEAS - LV SYSTOLIC VOL/BSA (12-30): 11.2 CM2
BH CV ECHO MEAS - LV V1 MAX: 121.9 CM/SEC
BH CV ECHO MEAS - LV V1 VTI: 25.9 CM
BH CV ECHO MEAS - LVIDD: 3.8 CM
BH CV ECHO MEAS - LVIDS: 2.8 CM
BH CV ECHO MEAS - LVOT AREA: 2.9 CM2
BH CV ECHO MEAS - LVOT DIAM: 1.92 CM
BH CV ECHO MEAS - LVPWD: 1.2 CM
BH CV ECHO MEAS - MED PEAK E' VEL: 8 CM/SEC
BH CV ECHO MEAS - MV MAX PG: 3 MMHG
BH CV ECHO MEAS - MV MEAN PG: 1.29 MMHG
BH CV ECHO MEAS - MV V2 VTI: 24.1 CM
BH CV ECHO MEAS - MVA(VTI): 3.1 CM2
BH CV ECHO MEAS - PA V2 MAX: 107.5 CM/SEC
BH CV ECHO MEAS - RAP SYSTOLE: 3 MMHG
BH CV ECHO MEAS - RV MAX PG: 3.4 MMHG
BH CV ECHO MEAS - RV V1 MAX: 92.2 CM/SEC
BH CV ECHO MEAS - RV V1 VTI: 17.4 CM
BH CV ECHO MEAS - RVSP: 18.3 MMHG
BH CV ECHO MEAS - SV(LVOT): 75.2 ML
BH CV ECHO MEAS - SV(MOD-SP2): 34.6 ML
BH CV ECHO MEAS - SV(MOD-SP4): 45.3 ML
BH CV ECHO MEAS - SVI(LVOT): 37.8 ML/M2
BH CV ECHO MEAS - SVI(MOD-SP2): 17.4 ML/M2
BH CV ECHO MEAS - SVI(MOD-SP4): 22.8 ML/M2
BH CV ECHO MEAS - TAPSE (>1.6): 2.2 CM
BH CV ECHO MEAS - TR MAX PG: 15.3 MMHG
BH CV ECHO MEAS - TR MAX VEL: 195.6 CM/SEC
BH CV XLRA - RV BASE: 3.2 CM
BH CV XLRA - RV MID: 1.7 CM
BH CV XLRA - TDI S': 10 CM/SEC
LEFT ATRIUM VOLUME INDEX: 28 ML/M2
LEFT ATRIUM VOLUME: 55 ML

## 2024-09-26 PROCEDURE — 93356 MYOCRD STRAIN IMG SPCKL TRCK: CPT

## 2024-09-26 PROCEDURE — 93306 TTE W/DOPPLER COMPLETE: CPT

## 2024-11-01 ENCOUNTER — OFFICE VISIT (OUTPATIENT)
Dept: CARDIOLOGY | Facility: CLINIC | Age: 64
End: 2024-11-01
Payer: COMMERCIAL

## 2024-11-01 VITALS
DIASTOLIC BLOOD PRESSURE: 88 MMHG | BODY MASS INDEX: 33.99 KG/M2 | SYSTOLIC BLOOD PRESSURE: 170 MMHG | HEART RATE: 82 BPM | HEIGHT: 65 IN | WEIGHT: 204 LBS | OXYGEN SATURATION: 99 %

## 2024-11-01 DIAGNOSIS — R07.2 PRECORDIAL PAIN: Primary | ICD-10-CM

## 2024-11-01 DIAGNOSIS — I65.22 CAROTID STENOSIS, ASYMPTOMATIC, LEFT: ICD-10-CM

## 2024-11-01 DIAGNOSIS — E66.2 CLASS 1 OBESITY WITH ALVEOLAR HYPOVENTILATION, SERIOUS COMORBIDITY, AND BODY MASS INDEX (BMI) OF 33.0 TO 33.9 IN ADULT: ICD-10-CM

## 2024-11-01 DIAGNOSIS — I10 PRIMARY HYPERTENSION: ICD-10-CM

## 2024-11-01 DIAGNOSIS — E78.2 MIXED HYPERLIPIDEMIA: ICD-10-CM

## 2024-11-01 DIAGNOSIS — E66.811 CLASS 1 OBESITY WITH ALVEOLAR HYPOVENTILATION, SERIOUS COMORBIDITY, AND BODY MASS INDEX (BMI) OF 33.0 TO 33.9 IN ADULT: ICD-10-CM

## 2024-11-01 DIAGNOSIS — G47.33 OSA (OBSTRUCTIVE SLEEP APNEA): Chronic | ICD-10-CM

## 2024-11-01 DIAGNOSIS — R06.09 DYSPNEA ON EXERTION: ICD-10-CM

## 2024-11-01 DIAGNOSIS — R55 SYNCOPE AND COLLAPSE: ICD-10-CM

## 2024-11-01 RX ORDER — PREGABALIN 100 MG/1
1 CAPSULE ORAL EVERY 8 HOURS SCHEDULED
COMMUNITY
Start: 2024-09-03 | End: 2024-12-02

## 2024-11-01 NOTE — PROGRESS NOTES
Subjective:     Encounter Date:11/01/2024      Patient ID: Peggy Regan is a 64 y.o. female with syncope, hypertension, hyperlipidemia, carotid stenosis, obstructive sleep apnea and obesity.    Chief Complaint: chest pain and dyspnea on exertion   History of Present Illness  Patient presents today for evaluation of chest pain and dyspnea on exertion. She underwent a stress test 2/16/2024 that was low risk of ischemia. She had an echo 9/26/2024 done and showed LVEF %, LVH, diastolic function consistent with age, no significant valve disease. Today she reports that she has been doing ok. She reports that she has some chest pain randomly. She reports that it is located in the center of her chest. She reports that she can have the feeling when she is stressed. She reports that it has happened when she walks the dog down the driveway or just resting on the couch. She denies any heart racing or palpitations. She denies any leg swelling. She is compliant with CPAP. She reports that she has 14 stairs to get to her back deck; she will get short of breath with go up there. She reports that she walks the dog a couple of times a day around her driveway which is flat and doesn't get as dyspneic. She reports that her BP has been running around 120-134/70-80. She reports checking it periodically. In the past she has had syncope with lower blood pressures. She reports that she does periodically pass out and they feel it is more related to orthostatic BP. Patient follows with Radha VERONICA as PCP.     Previous Cardiac Testing and Procedures:  -Echo (9/15/2016) EF 55-60%, mild LVH, no significant valvular abnormalities, trivial pericardial effusion  -MRI brain (1/16/2023) findings at the right parietotemporal lobe and right cerebellum suggestive of prior trauma or surgery, partial effacement of the anterior aspect of the right lateral ventricle which may represent volume averaging  -Carotid ultrasound (1/18/2023)  less than 50% right ICA, 50 to 69% left ICA  -Lipid panel (2/20/2023) total cholesterol 203, HDL 63, , triglycerides 118   -Echo (2/21/2023) EF 66-70%, normal diastolic function, normal RV size and function, no significant valve dysfunction, normal RVSP  -Holter monitor (2/22/2023) sinus rhythm throughout with rare PACs and PVCs, no significant pauses, arrhythmias, or events, no symptoms reported  -Echo (9/29/2024): showed LVEF %, LVH, diastolic function consistent with age, no significant valve disease    The following portions of the patient's history were reviewed and updated as appropriate: allergies, current medications, past family history, past medical history, past social history, past surgical history and problem list.    Allergies   Allergen Reactions    Tetracyclines & Related Swelling     Mouth swelling    Erythromycin Nausea And Vomiting    Penicillins Hives       Current Outpatient Medications:     aspirin 81 MG EC tablet, Take 1 tablet by mouth Daily., Disp: , Rfl:     buPROPion XL (WELLBUTRIN XL) 150 MG 24 hr tablet, Take 1 tablet by mouth Daily., Disp: , Rfl:     busPIRone (BUSPAR) 5 MG tablet, Take 1 tablet by mouth 2 (Two) Times a Day., Disp: , Rfl:     cyclobenzaprine (FLEXERIL) 10 MG tablet, Take 1 tablet by mouth 2 (Two) Times a Day As Needed for Muscle Spasms., Disp: , Rfl:     docusate sodium (COLACE) 100 MG capsule, Take 1 capsule by mouth As Needed for Constipation., Disp: , Rfl:     DULoxetine (CYMBALTA) 60 MG capsule, Take 2 capsules by mouth Daily., Disp: , Rfl:     fenofibrate (TRICOR) 145 MG tablet, Take 1 tablet by mouth Daily., Disp: , Rfl:     losartan (COZAAR) 25 MG tablet, Take 1 tablet by mouth Daily., Disp: 90 tablet, Rfl: 0    montelukast (SINGULAIR) 10 MG tablet, Take 1 tablet by mouth Daily., Disp: , Rfl:     pantoprazole (PROTONIX) 40 MG EC tablet, Take 1 tablet by mouth 2 (Two) Times a Day., Disp: , Rfl:     polyethylene glycol (MiraLax) 17 g packet, Take 17 g  by mouth As Needed., Disp: , Rfl:     pregabalin (Lyrica) 100 MG capsule, 1 capsule Every 8 (Eight) Hours., Disp: , Rfl:     simvastatin (ZOCOR) 40 MG tablet, Take 1 tablet by mouth Daily., Disp: , Rfl:     sodium bicarbonate 325 MG tablet, Take 1 tablet by mouth 2 (Two) Times a Day., Disp: , Rfl:     Symbicort 160-4.5 MCG/ACT inhaler, Inhale 1 puff 2 (Two) Times a Day., Disp: , Rfl:     vitamin D (ERGOCALCIFEROL) 1.25 MG (66030 UT) capsule capsule, Take 1 capsule by mouth 1 (One) Time Per Week., Disp: , Rfl:       Past Medical History:   Diagnosis Date    Arthritis     Asthma     Cancer     skin ca    Carotid artery occlusion 01/31/2023    Chronic kidney disease, stage 3a     Cough     Multifactorial and adversely affected by shy and obesity    Degenerative disc disease, lumbar     Degenerative disc disease, thoracic     Depression     Family history of colonic polyps     Fibromyalgia, primary     Functional dyspepsia     GERD (gastroesophageal reflux disease)     Head injury 01/2023    Showed on mri    Headache, tension-type     Years I have had HA    History of adenomatous polyp of colon     History of colon polyps     History of squamous cell carcinoma 11/29/2021    Hypercholesteremia     Hypertension     Memory loss 12/2022    I think memory going a couple years ago    Sleep apnea     CPAP    Spinal headache     Syncope     Vision loss     Wear glasses    Wears glasses      Social History     Socioeconomic History    Marital status:    Tobacco Use    Smoking status: Never     Passive exposure: Past    Smokeless tobacco: Never   Vaping Use    Vaping status: Never Used   Substance and Sexual Activity    Alcohol use: Never    Drug use: Never    Sexual activity: Defer       Review of Systems   Constitutional: Negative for malaise/fatigue.   Cardiovascular:  Positive for chest pain, dyspnea on exertion and syncope. Negative for irregular heartbeat, leg swelling, near-syncope, orthopnea, palpitations and  "paroxysmal nocturnal dyspnea.   Hematologic/Lymphatic: Does not bruise/bleed easily.   Neurological:  Positive for dizziness and weakness.   All other systems reviewed and are negative.         Objective:     Vitals reviewed.   Constitutional:       General: Not in acute distress.     Appearance: Normal appearance. Well-developed. Obese.   Eyes:      Pupils: Pupils are equal, round, and reactive to light.   HENT:      Head: Normocephalic and atraumatic.      Nose: Nose normal.   Neck:      Vascular: No carotid bruit.   Pulmonary:      Effort: Pulmonary effort is normal. No respiratory distress.      Breath sounds: Normal breath sounds. No wheezing. No rales.   Cardiovascular:      Normal rate. Regular rhythm.      Murmurs: There is no murmur.   Edema:     Peripheral edema absent.   Abdominal:      General: There is no distension.      Palpations: Abdomen is soft.   Musculoskeletal: Normal range of motion.      Cervical back: Normal range of motion and neck supple. Skin:     General: Skin is warm.      Findings: No erythema or rash.   Neurological:      General: No focal deficit present.      Mental Status: Alert and oriented to person, place, and time.   Psychiatric:         Attention and Perception: Attention normal.         Mood and Affect: Mood normal.         Speech: Speech normal.         Behavior: Behavior normal.         Thought Content: Thought content normal.         Judgment: Judgment normal.         /88 (BP Location: Left arm, Patient Position: Sitting, Cuff Size: Adult)   Pulse 82   Ht 165.1 cm (65\")   Wt 92.5 kg (204 lb)   SpO2 99%   BMI 33.95 kg/m²       ECG 12 Lead    Date/Time: 11/1/2024 10:43 AM  Performed by: Miguel Barnett APRN    Authorized by: Miguel Barnett APRN  Comparison: compared with previous ECG from 2/20/2023  Similar to previous ECG  Rhythm: sinus rhythm  Rate: normal  BPM: 82  Other findings: non-specific ST-T wave changes          Lab Review:    Results for orders " placed during the hospital encounter of 09/26/24    Adult Transthoracic Echo Complete W/ Cont if Necessary Per Protocol    Interpretation Summary    Left ventricular systolic function is normal. Left ventricular ejection fraction appears to be 66 - 70%.    Left ventricular wall thickness is consistent with mild concentric hypertrophy.    Left ventricular diastolic function is consistent with age.    Normal right ventricular cavity size and systolic function noted.    There is no significant (greater than mild) valvular dysfunction.    Lab Results   Component Value Date    CHOL 203 (H) 02/20/2023    TRIG 118 02/20/2023    HDL 63 (H) 02/20/2023     (H) 02/20/2023     Lab Results   Component Value Date    HGBA1C 5.10 02/19/2023     I have personally reviewed echo, stress test, and past office notes prior to patients visit   Assessment:          Diagnosis Plan   1. Precordial pain        2. Dyspnea on exertion        3. Syncope and collapse        4. Primary hypertension        5. Mixed hyperlipidemia        6. Carotid stenosis, asymptomatic, left        7. ANGELLA (obstructive sleep apnea)        8. Class 1 obesity with alveolar hypoventilation, serious comorbidity, and body mass index (BMI) of 33.0 to 33.9 in adult               Plan:       1/2. Chest pain/dyspnea on exertion: Stress test 2/2024 was low risk for ischemia. Echo 9/2024 showed normal structure and function of heart. No cardiac etiology found. Discussed obesity and deconditioning as contributing factors.     3. Syncope: Patient reports that this happens much less. She reports that she has been changing positions slowly and taking precautions.     4. Hypertension: Elevated in office. Patient reports better control at home. Will not make any adjustments at this time and instruct patient to follow up with PCP especially as it is felt that syncope was secondary to orthostatsis.     5. Hyperlipidemia: Lipid panel 2/2023 showed elevation of LDL at 119.  Managed by PCP. Continue fenofibrate and simvastatin    6. Carotid stenosis: Nonobstructive disease up to 50-69% left ICA. Follows with Dr Martinez. Continue aspirin and simvastatin    7. Obstructive sleep apnea: Compliant with CPAP    8. Obesity: BMI is >= 30 and <35. (Class 1 Obesity). The following options were offered after discussion;: exercise counseling/recommendations and nutrition counseling/recommendations    I spent 35 minutes caring for Peggy on this date of service. This time includes time spent by me in the following activities:preparing for the visit, reviewing tests, obtaining and/or reviewing a separately obtained history, performing a medically appropriate examination and/or evaluation , counseling and educating the patient/family/caregiver, and documenting information in the medical record     I spent 2 minutes on the separately reported service of EKG. This time is not included in the time used to support the E/M service also reported today.    Patient is to follow on an as needed basis

## 2024-11-18 NOTE — PROGRESS NOTES
"Subjective    Ms. Regan is 64 y.o. female    Chief Complaint: Urine frequency    History of Present Illness    64-year-old female new patient referred by PCP due to report of urine frequency and reduced output.  Patient reports underwent ultrasound to check residual and was told \"I had a moderate amount\".  Reports for the last 6 months has noted increased urine frequency urinating every 45 minutes to an hour.  With occasional inability to make it to the bathroom in time.    The following portions of the patient's history were reviewed and updated as appropriate: allergies, current medications, past family history, past medical history, past social history, past surgical history and problem list.    Review of Systems   Constitutional:  Negative for chills and fever.   Gastrointestinal:  Negative for abdominal pain, anal bleeding and blood in stool.   Genitourinary:  Positive for frequency and urgency. Negative for dysuria and hematuria.         Current Outpatient Medications:     aspirin 81 MG EC tablet, Take 1 tablet by mouth Daily., Disp: , Rfl:     buPROPion XL (WELLBUTRIN XL) 150 MG 24 hr tablet, Take 1 tablet by mouth Daily., Disp: , Rfl:     busPIRone (BUSPAR) 5 MG tablet, Take 1 tablet by mouth 2 (Two) Times a Day., Disp: , Rfl:     cyclobenzaprine (FLEXERIL) 10 MG tablet, Take 1 tablet by mouth 2 (Two) Times a Day As Needed for Muscle Spasms., Disp: , Rfl:     docusate sodium (COLACE) 100 MG capsule, Take 1 capsule by mouth As Needed for Constipation., Disp: , Rfl:     DULoxetine (CYMBALTA) 60 MG capsule, Take 2 capsules by mouth Daily., Disp: , Rfl:     fenofibrate (TRICOR) 145 MG tablet, Take 1 tablet by mouth Daily., Disp: , Rfl:     losartan (COZAAR) 25 MG tablet, Take 1 tablet by mouth Daily., Disp: 90 tablet, Rfl: 0    montelukast (SINGULAIR) 10 MG tablet, Take 1 tablet by mouth Daily., Disp: , Rfl:     pantoprazole (PROTONIX) 40 MG EC tablet, Take 1 tablet by mouth 2 (Two) Times a Day., Disp: , Rfl: "     polyethylene glycol (MiraLax) 17 g packet, Take 17 g by mouth As Needed., Disp: , Rfl:     pregabalin (Lyrica) 100 MG capsule, 1 capsule Every 8 (Eight) Hours., Disp: , Rfl:     simvastatin (ZOCOR) 40 MG tablet, Take 1 tablet by mouth Daily., Disp: , Rfl:     sodium bicarbonate 325 MG tablet, Take 1 tablet by mouth 2 (Two) Times a Day., Disp: , Rfl:     Symbicort 160-4.5 MCG/ACT inhaler, Inhale 1 puff 2 (Two) Times a Day., Disp: , Rfl:     vitamin D (ERGOCALCIFEROL) 1.25 MG (08437 UT) capsule capsule, Take 1 capsule by mouth 1 (One) Time Per Week., Disp: , Rfl:     vitamin D3 125 MCG (5000 UT) capsule capsule, Take 1 capsule by mouth Daily., Disp: , Rfl:     oxybutynin XL (Ditropan XL) 10 MG 24 hr tablet, Take 1 tablet by mouth Daily., Disp: 30 tablet, Rfl: 11    Past Medical History:   Diagnosis Date    Arthritis     Asthma     Cancer     skin ca    Carotid artery occlusion 2023    Chronic kidney disease, stage 3a     Cough     Multifactorial and adversely affected by shy and obesity    Degenerative disc disease, lumbar     Degenerative disc disease, thoracic     Depression     Family history of colonic polyps     Fibromyalgia, primary     Functional dyspepsia     GERD (gastroesophageal reflux disease)     Head injury 2023    Showed on mri    Headache, tension-type     Years I have had HA    History of adenomatous polyp of colon     History of colon polyps     History of squamous cell carcinoma 2021    Hypercholesteremia     Hypertension     Memory loss 2022    I think memory going a couple years ago    Sleep apnea     CPAP    Spinal headache     Syncope     Vision loss     Wear glasses    Wears glasses        Past Surgical History:   Procedure Laterality Date    ANTERIOR CERVICAL FUSION  2016    BRAVO PROCEDURE  2011    Borderline test while off PPI; Day 1 suggestive of reflux disease and Day 2 is negative     SECTION      X2    CHOLECYSTECTOMY      COLONOSCOPY  10/29/2015     One 5mm polyp in the sigmoid colon-complete resection, polyp tissue not retrieved; Examination was otherwise normal on direct and retroflexion views; Repeat 5 years     COLONOSCOPY  08/18/2003    Normal; Repeat at age 50    COLONOSCOPY  11/01/2010    Internal hemorrhoids; One 1cm polyp in the descending colon-path shows benign submucosal leiomyoma; Repeat 3 years    COLONOSCOPY  06/16/1992    Dr. Arredondo-Rox colonoscopy    COLONOSCOPY N/A 04/28/2021    One 5mm adenomatous polyp in the ascending colon; One 4mm adenomatous polyp in the transverse colon; Three 4-6mm polyps in the descending colon-path shows two adenomatous and one hyperplastic polyp; The examination was otherwise normal on direct and retroflexion views; Repeat 3 years    COLONOSCOPY N/A 7/31/2024    Procedure: COLONOSCOPY WITH ANESTHESIA;  Surgeon: Peggy Zuñiga MD;  Location: [facility] ENDOSCOPY;  Service: Gastroenterology;  Laterality: N/A;  pre op history of colon polyps  post polyps  pcp Radha Pepe    ENDOSCOPY  01/11/2011    Normal esophagus; Normal stomach; Normal examined duodenum; BRAVO capsule placed     ENDOSCOPY  12/01/2010    Suboptimal view due to retained gastric contents; Will check SPGE scan; Will repeat endo/MAC with BRAVO replacement    ENDOSCOPY  08/18/2003    Normal; RTC 3 months    ENDOSCOPY  09/22/1994    Dr. Morales endoscopy; Slant taken for H.Pylori    HYSTERECTOMY      KNEE ARTHROSCOPY      VENTRAL/INCISIONAL HERNIA REPAIR N/A 12/28/2016    Procedure: VENTRAL/INCISIONAL HERNIA REPAIR WITH MESH;  Surgeon: Mckenna Adair MD;  Location: [facility] OR;  Service:        Social History     Socioeconomic History    Marital status:    Tobacco Use    Smoking status: Never     Passive exposure: Past    Smokeless tobacco: Never   Vaping Use    Vaping status: Never Used   Substance and Sexual Activity    Alcohol use: Never    Drug use: Never    Sexual activity: Defer       Family History   Problem Relation Age of Onset     Heart disease Mother     Colon polyps Mother         > 60 years of age     Dementia Mother     Stroke Mother     Colon polyps Father         > 60 years of age     Hypertension Sister     Migraines Sister     Neuropathy Sister         Trigeminal neuralgia    Colon cancer Neg Hx     Esophageal cancer Neg Hx     Liver cancer Neg Hx     Liver disease Neg Hx     Rectal cancer Neg Hx     Stomach cancer Neg Hx        Objective    There were no vitals taken for this visit.    Physical Exam  Nursing note reviewed.   Constitutional:       General: She is not in acute distress.     Appearance: Normal appearance. She is not ill-appearing.   HENT:      Nose: No congestion.   Abdominal:      Tenderness: There is no right CVA tenderness or left CVA tenderness.      Hernia: No hernia is present.   Skin:     General: Skin is warm and dry.   Neurological:      Mental Status: She is alert and oriented to person, place, and time.   Psychiatric:         Mood and Affect: Mood normal.         Behavior: Behavior normal.             Results for orders placed or performed in visit on 11/20/24   POC Urinalysis Dipstick, Multipro    Collection Time: 11/20/24  2:17 PM    Specimen: Urine   Result Value Ref Range    Color Yellow Yellow, Straw, Dark Yellow, Jaqueline    Clarity, UA Clear Clear    Glucose, UA Negative Negative mg/dL    Bilirubin Negative Negative    Ketones, UA Negative Negative    Specific Gravity  1.015 1.005 - 1.030    Blood, UA Negative Negative    pH, Urine 7.0 5.0 - 8.0    Protein, POC Negative Negative mg/dL    Urobilinogen, UA 0.2 E.U./dL Normal, 0.2 E.U./dL    Nitrite, UA Negative Negative    Leukocytes Trace (A) Negative   Estimation of residual urine via abdominal ultrasound  Residual Urine:25  ml  Indication: hesitancy  Position: Supine  Examination: Incremental scanning of the suprapubic area using 3 MHz transducer using copious amounts of acoustic gel.   Findings: An anechoic area was demonstrated which represented the  bladder, with measurement of residual urine as noted. I inspected this myself. In that the residual urine was stable or insignificant, no treatment will be necessary at this time.    IMAGING SCANNED (11/07/2024)   Assessment and Plan    Diagnoses and all orders for this visit:    1. Urinary hesitancy (Primary)  -     POC Urinalysis Dipstick, Multipro    2. Urine frequency  -     oxybutynin XL (Ditropan XL) 10 MG 24 hr tablet; Take 1 tablet by mouth Daily.  Dispense: 30 tablet; Refill: 11      Reviewed ultrasound imaging and residual report patient only was found to be retaining 32 mL.  Repeat bladder scan completed today during office revealing 25 mL postvoid residual.  Patient is NOT retaining large volume urine.  After lengthy discussion with patient it sounds as though patient is experiencing more of overactive bladder symptoms urinary frequency which is causing small voided volumes with each urination as patient is urinating every 45 minutes to 1 hour.  Based on this recommend starting on an overactive bladder medication.    Follow-up 3 months

## 2024-11-20 ENCOUNTER — OFFICE VISIT (OUTPATIENT)
Dept: UROLOGY | Facility: CLINIC | Age: 64
End: 2024-11-20
Payer: COMMERCIAL

## 2024-11-20 DIAGNOSIS — R35.0 URINE FREQUENCY: ICD-10-CM

## 2024-11-20 DIAGNOSIS — R39.11 URINARY HESITANCY: Primary | ICD-10-CM

## 2024-11-20 LAB
BILIRUB BLD-MCNC: NEGATIVE MG/DL
CLARITY, POC: CLEAR
COLOR UR: YELLOW
GLUCOSE UR STRIP-MCNC: NEGATIVE MG/DL
KETONES UR QL: NEGATIVE
LEUKOCYTE EST, POC: ABNORMAL
NITRITE UR-MCNC: NEGATIVE MG/ML
PH UR: 7 [PH] (ref 5–8)
PROT UR STRIP-MCNC: NEGATIVE MG/DL
RBC # UR STRIP: NEGATIVE /UL
SP GR UR: 1.01 (ref 1–1.03)
UROBILINOGEN UR QL: ABNORMAL

## 2024-11-20 PROCEDURE — 81001 URINALYSIS AUTO W/SCOPE: CPT

## 2024-11-20 PROCEDURE — 51798 US URINE CAPACITY MEASURE: CPT

## 2024-11-20 PROCEDURE — 99214 OFFICE O/P EST MOD 30 MIN: CPT

## 2024-11-20 RX ORDER — OXYBUTYNIN CHLORIDE 10 MG/1
10 TABLET, EXTENDED RELEASE ORAL DAILY
Qty: 30 TABLET | Refills: 11 | Status: SHIPPED | OUTPATIENT
Start: 2024-11-20

## 2024-12-12 ENCOUNTER — TELEPHONE (OUTPATIENT)
Dept: UROLOGY | Facility: CLINIC | Age: 64
End: 2024-12-12
Payer: COMMERCIAL

## 2024-12-12 DIAGNOSIS — R35.0 URINE FREQUENCY: ICD-10-CM

## 2024-12-12 DIAGNOSIS — R39.11 URINARY HESITANCY: Primary | ICD-10-CM

## 2024-12-12 NOTE — TELEPHONE ENCOUNTER
Patient called in stating the Oxybutynin IS causing her some constipation and dry mouth. I let her know I would get a message over to Leti and see what she would want to do and I would give her a call back.

## 2024-12-13 RX ORDER — SOLIFENACIN SUCCINATE 10 MG/1
10 TABLET, FILM COATED ORAL DAILY
Qty: 30 TABLET | Refills: 2 | Status: SHIPPED | OUTPATIENT
Start: 2024-12-13

## 2025-01-30 ENCOUNTER — TELEPHONE (OUTPATIENT)
Dept: VASCULAR SURGERY | Facility: CLINIC | Age: 65
End: 2025-01-30
Payer: COMMERCIAL

## 2025-01-31 ENCOUNTER — OFFICE VISIT (OUTPATIENT)
Dept: VASCULAR SURGERY | Facility: CLINIC | Age: 65
End: 2025-01-31
Payer: COMMERCIAL

## 2025-01-31 ENCOUNTER — HOSPITAL ENCOUNTER (OUTPATIENT)
Dept: ULTRASOUND IMAGING | Facility: HOSPITAL | Age: 65
Discharge: HOME | End: 2025-01-31
Payer: MEDICARE

## 2025-01-31 VITALS
HEART RATE: 84 BPM | DIASTOLIC BLOOD PRESSURE: 64 MMHG | SYSTOLIC BLOOD PRESSURE: 132 MMHG | BODY MASS INDEX: 33.99 KG/M2 | HEIGHT: 65 IN | OXYGEN SATURATION: 98 % | WEIGHT: 204 LBS

## 2025-01-31 DIAGNOSIS — I65.23 BILATERAL CAROTID ARTERY STENOSIS: ICD-10-CM

## 2025-01-31 DIAGNOSIS — I65.23 BILATERAL CAROTID ARTERY STENOSIS: Primary | ICD-10-CM

## 2025-01-31 DIAGNOSIS — E66.2 CLASS 1 OBESITY WITH ALVEOLAR HYPOVENTILATION, SERIOUS COMORBIDITY, AND BODY MASS INDEX (BMI) OF 33.0 TO 33.9 IN ADULT: ICD-10-CM

## 2025-01-31 DIAGNOSIS — I10 ESSENTIAL HYPERTENSION: ICD-10-CM

## 2025-01-31 DIAGNOSIS — E66.811 CLASS 1 OBESITY WITH ALVEOLAR HYPOVENTILATION, SERIOUS COMORBIDITY, AND BODY MASS INDEX (BMI) OF 33.0 TO 33.9 IN ADULT: ICD-10-CM

## 2025-01-31 DIAGNOSIS — E78.2 MIXED HYPERLIPIDEMIA: ICD-10-CM

## 2025-01-31 PROCEDURE — 93880 EXTRACRANIAL BILAT STUDY: CPT

## 2025-01-31 PROCEDURE — 99214 OFFICE O/P EST MOD 30 MIN: CPT | Performed by: NURSE PRACTITIONER

## 2025-01-31 NOTE — LETTER
"January 31, 2025     JEREMÍAS Wilkes  [address] [facility] [address]    Patient: Peggy Regan   YOB: 1960   Date of Visit: 1/31/2025     Dear JEREMÍAS Wilkes:       Thank you for referring Peggy Regan to me for evaluation. Below are the relevant portions of my assessment and plan of care.    If you have questions, please do not hesitate to call me. I look forward to following Pegyg along with you.         Sincerely,        JEREMÍAS Lopez        CC: No Recipients    Bela Ricardo APRN  01/31/25 0944  Sign when Signing Visit  1/31/2025       Radha Pepe APRN  [address] [facility] [address]      Peggy Regan  1960    Chief Complaint   Patient presents with   • Bilateral carotid artery stenosis     TESTING THIS AM DONE HERE,no complaints or changes, no stroke symptoms.       Dear Radha Pepe APRN     HPI  I had the pleasure of seeing your patient Peggy Regan in the office today.   As you recall, Peggy Regan is a 64 y.o.  female who you are currently following for routine health maintenance.  We are following for carotid occlusive disease.  She is maintained on aspirin and Zocor.  She did have noninvasive testing performed today, which I did review in office.        Review of Systems   Constitutional: Negative.    HENT: Negative.     Eyes: Negative.    Respiratory: Negative.     Cardiovascular: Negative.    Gastrointestinal: Negative.    Endocrine: Negative.    Genitourinary: Negative.    Musculoskeletal: Negative.    Skin: Negative.    Allergic/Immunologic: Negative.    Neurological: Negative.    Hematological: Negative.    Psychiatric/Behavioral: Negative.            /64   Pulse 84   Ht 165.1 cm (65\")   Wt 92.5 kg (204 lb)   SpO2 98%   BMI 33.95 kg/m²     Physical Exam  Vitals and nursing note reviewed.   Constitutional:       General: She is not in acute distress.     Appearance: Normal appearance. She is " well-developed. She is obese. She is not diaphoretic.   HENT:      Head: Normocephalic and atraumatic.   Eyes:      General: No scleral icterus.     Pupils: Pupils are equal, round, and reactive to light.   Neck:      Thyroid: No thyromegaly.      Vascular: No carotid bruit or JVD.   Cardiovascular:      Rate and Rhythm: Normal rate and regular rhythm.      Pulses: Normal pulses.      Heart sounds: Normal heart sounds and S2 normal. No murmur heard.     No friction rub. No gallop.   Pulmonary:      Effort: Pulmonary effort is normal.      Breath sounds: Normal breath sounds.   Abdominal:      General: Bowel sounds are normal.      Palpations: Abdomen is soft.   Musculoskeletal:         General: Normal range of motion.      Cervical back: Normal range of motion and neck supple.   Skin:     General: Skin is warm and dry.   Neurological:      General: No focal deficit present.      Mental Status: She is alert and oriented to person, place, and time.      Cranial Nerves: No cranial nerve deficit.   Psychiatric:         Mood and Affect: Mood normal.         Behavior: Behavior normal.         Thought Content: Thought content normal.         Judgment: Judgment normal.       Diagnostic Data:  Noninvasive testing including a carotid duplex shows less than 50% carotid stenosis bilaterally with bilateral antegrade vertebral flow.       Patient Active Problem List   Diagnosis   • Essential hypertension   • Mixed hyperlipidemia   • Shortness of breath   • Fibromyalgia   • Depression   • DDD (degenerative disc disease), cervical   • DDD (degenerative disc disease), lumbosacral   • GERD (gastroesophageal reflux disease)   • ANGELLA (obstructive sleep apnea)   • Asthma   • Syncope   • S/P GIOVANNA-BSO   • S/P cervical spinal fusion   • S/P  section   • Hx of adenomatous colonic polyps   • Family history of polyps in the colon   • Recurrent syncope   • Carotid stenosis, asymptomatic, left   • Class 1 obesity with alveolar  hypoventilation, serious comorbidity, and body mass index (BMI) of 33.0 to 33.9 in adult   • Precordial pain        Diagnosis Plan   1. Bilateral carotid artery stenosis  US Carotid Bilateral      2. Essential hypertension        3. Mixed hyperlipidemia        4. Class 1 obesity with alveolar hypoventilation, serious comorbidity, and body mass index (BMI) of 33.0 to 33.9 in adult                Plan: After thoroughly evaluating Peggy Regan, I believe the best course of action is to remain conservative from vascular surgery standpoint.  Currently she is doing well and denies any strokelike symptoms.  I did review her testing which shows less than 50% carotid stenosis bilaterally.  We will see her back in 1 year with repeat noninvasive testing for continued surveillance, including a carotid duplex.  I did discuss vascular risk factors as they pertain to the progression of vascular disease including controlling her hypertension and hyperlipidemia.  Her blood pressure is stable on her current medications.  She should continue taking her aspirin 81 mg daily and Zocor 40 mg daily in addition to her other medications.  Body mass index is 33.95 kg/m².    This was all discussed in full with complete understanding.  Thank you for allowing me to participate in the care of your patient.  Please do not hesitate to call with any questions or concerns.  We will keep you aware of any further encounters with Peggy Regan.        Sincerely yours,         Bela Ricardo, JEREMÍAS Pepe, JEREMÍAS Peña

## 2025-01-31 NOTE — PROGRESS NOTES
"1/31/2025       Radha Pepe, APRN  [address] [facility] [place] [address]      Peggy Regan  1960    Chief Complaint   Patient presents with    Bilateral carotid artery stenosis     TESTING THIS AM DONE HERE,no complaints or changes, no stroke symptoms.       Dear Radha Pepe, JEREMÍAS     HPI  I had the pleasure of seeing your patient Peggy Regan in the office today.   As you recall, Peggy Regan is a 64 y.o.  female who you are currently following for routine health maintenance.  We are following for carotid occlusive disease.  She is maintained on aspirin and Zocor.  She did have noninvasive testing performed today, which I did review in office.        Review of Systems   Constitutional: Negative.    HENT: Negative.     Eyes: Negative.    Respiratory: Negative.     Cardiovascular: Negative.    Gastrointestinal: Negative.    Endocrine: Negative.    Genitourinary: Negative.    Musculoskeletal: Negative.    Skin: Negative.    Allergic/Immunologic: Negative.    Neurological: Negative.    Hematological: Negative.    Psychiatric/Behavioral: Negative.            /64   Pulse 84   Ht 165.1 cm (65\")   Wt 92.5 kg (204 lb)   SpO2 98%   BMI 33.95 kg/m²     Physical Exam  Vitals and nursing note reviewed.   Constitutional:       General: She is not in acute distress.     Appearance: Normal appearance. She is well-developed. She is obese. She is not diaphoretic.   HENT:      Head: Normocephalic and atraumatic.   Eyes:      General: No scleral icterus.     Pupils: Pupils are equal, round, and reactive to light.   Neck:      Thyroid: No thyromegaly.      Vascular: No carotid bruit or JVD.   Cardiovascular:      Rate and Rhythm: Normal rate and regular rhythm.      Pulses: Normal pulses.      Heart sounds: Normal heart sounds and S2 normal. No murmur heard.     No friction rub. No gallop.   Pulmonary:      Effort: Pulmonary effort is normal.      Breath sounds: Normal breath sounds. "   Abdominal:      General: Bowel sounds are normal.      Palpations: Abdomen is soft.   Musculoskeletal:         General: Normal range of motion.      Cervical back: Normal range of motion and neck supple.   Skin:     General: Skin is warm and dry.   Neurological:      General: No focal deficit present.      Mental Status: She is alert and oriented to person, place, and time.      Cranial Nerves: No cranial nerve deficit.   Psychiatric:         Mood and Affect: Mood normal.         Behavior: Behavior normal.         Thought Content: Thought content normal.         Judgment: Judgment normal.       Diagnostic Data:  Noninvasive testing including a carotid duplex shows less than 50% carotid stenosis bilaterally with bilateral antegrade vertebral flow.       Patient Active Problem List   Diagnosis    Essential hypertension    Mixed hyperlipidemia    Shortness of breath    Fibromyalgia    Depression    DDD (degenerative disc disease), cervical    DDD (degenerative disc disease), lumbosacral    GERD (gastroesophageal reflux disease)    ANGELLA (obstructive sleep apnea)    Asthma    Syncope    S/P GIOVANNA-BSO    S/P cervical spinal fusion    S/P  section    Hx of adenomatous colonic polyps    Family history of polyps in the colon    Recurrent syncope    Carotid stenosis, asymptomatic, left    Class 1 obesity with alveolar hypoventilation, serious comorbidity, and body mass index (BMI) of 33.0 to 33.9 in adult    Precordial pain        Diagnosis Plan   1. Bilateral carotid artery stenosis  US Carotid Bilateral      2. Essential hypertension        3. Mixed hyperlipidemia        4. Class 1 obesity with alveolar hypoventilation, serious comorbidity, and body mass index (BMI) of 33.0 to 33.9 in adult                Plan: After thoroughly evaluating Peggy Regan, I believe the best course of action is to remain conservative from vascular surgery standpoint.  Currently she is doing well and denies any strokelike symptoms.   I did review her testing which shows less than 50% carotid stenosis bilaterally.  We will see her back in 1 year with repeat noninvasive testing for continued surveillance, including a carotid duplex.  I did discuss vascular risk factors as they pertain to the progression of vascular disease including controlling her hypertension and hyperlipidemia.  Her blood pressure is stable on her current medications.  She should continue taking her aspirin 81 mg daily and Zocor 40 mg daily in addition to her other medications.  Body mass index is 33.95 kg/m².    This was all discussed in full with complete understanding.  Thank you for allowing me to participate in the care of your patient.  Please do not hesitate to call with any questions or concerns.  We will keep you aware of any further encounters with Peggy Regan.        Sincerely yours,         Bela Ricardo, JEREMÍAS Pepe, JEREMÍAS Peña

## 2025-02-21 NOTE — PROGRESS NOTES
Subjective    Ms. Regan is 64 y.o. female    Chief Complaint: Follow-up OAB/urge incontinence    History of Present Illness    64-year-old female established patient in for follow-up after starting on overactive bladder medication for reports of urinary frequency every 45 minutes to an hour in addition to inability to make it to the bathroom in time.  Patient reports was unable to tolerate the oxybutynin due to severe dry mouth.  Was later switched to Solifenacin.  Patient reporting the medication worked well from a urinary standpoint however is experiencing again severe dryness all over even in her skin on extremities to the point patient states she would like to stop the medication.  And is interested in further options.    The following portions of the patient's history were reviewed and updated as appropriate: allergies, current medications, past family history, past medical history, past social history, past surgical history and problem list.    Review of Systems   Constitutional:  Negative for chills and fever.   Gastrointestinal:  Negative for abdominal pain, anal bleeding and blood in stool.   Genitourinary:  Negative for dysuria and hematuria.         Current Outpatient Medications:     aspirin 81 MG EC tablet, Take 1 tablet by mouth Daily., Disp: , Rfl:     buPROPion XL (WELLBUTRIN XL) 150 MG 24 hr tablet, Take 1 tablet by mouth Daily., Disp: , Rfl:     busPIRone (BUSPAR) 5 MG tablet, Take 1 tablet by mouth 2 (Two) Times a Day., Disp: , Rfl:     cyclobenzaprine (FLEXERIL) 10 MG tablet, Take 1 tablet by mouth 2 (Two) Times a Day As Needed for Muscle Spasms., Disp: , Rfl:     docusate sodium (COLACE) 100 MG capsule, Take 1 capsule by mouth As Needed for Constipation., Disp: , Rfl:     DULoxetine (CYMBALTA) 60 MG capsule, Take 2 capsules by mouth Daily., Disp: , Rfl:     fenofibrate (TRICOR) 145 MG tablet, Take 1 tablet by mouth Daily., Disp: , Rfl:     losartan (COZAAR) 25 MG tablet, Take 1 tablet by mouth  Daily., Disp: 90 tablet, Rfl: 0    montelukast (SINGULAIR) 10 MG tablet, Take 1 tablet by mouth Daily., Disp: , Rfl:     pantoprazole (PROTONIX) 40 MG EC tablet, Take 1 tablet by mouth 2 (Two) Times a Day., Disp: , Rfl:     polyethylene glycol (MiraLax) 17 g packet, Take 17 g by mouth As Needed., Disp: , Rfl:     simvastatin (ZOCOR) 40 MG tablet, Take 1 tablet by mouth Daily., Disp: , Rfl:     sodium bicarbonate 325 MG tablet, Take 1 tablet by mouth 2 (Two) Times a Day., Disp: , Rfl:     Symbicort 160-4.5 MCG/ACT inhaler, Inhale 1 puff 2 (Two) Times a Day., Disp: , Rfl:     vitamin D (ERGOCALCIFEROL) 1.25 MG (29295 UT) capsule capsule, Take 1 capsule by mouth 1 (One) Time Per Week., Disp: , Rfl:     vitamin D3 125 MCG (5000 UT) capsule capsule, Take 1 capsule by mouth Daily., Disp: , Rfl:     pregabalin (Lyrica) 100 MG capsule, 1 capsule Every 8 (Eight) Hours., Disp: , Rfl:     Past Medical History:   Diagnosis Date    Arthritis     Asthma     Cancer     skin ca    Carotid artery occlusion 01/31/2023    Chronic kidney disease, stage 3a     Cough     Multifactorial and adversely affected by shy and obesity    Degenerative disc disease, lumbar     Degenerative disc disease, thoracic     Depression     Family history of colonic polyps     Fibromyalgia, primary     Functional dyspepsia     GERD (gastroesophageal reflux disease)     Head injury 01/2023    Showed on mri    Headache, tension-type     Years I have had HA    History of adenomatous polyp of colon     History of colon polyps     History of squamous cell carcinoma 11/29/2021    Hypercholesteremia     Hypertension     Memory loss 12/2022    I think memory going a couple years ago    Sleep apnea     CPAP    Spinal headache     Syncope     Vision loss     Wear glasses    Wears glasses        Past Surgical History:   Procedure Laterality Date    ANTERIOR CERVICAL FUSION  09/09/2016    BRAVO PROCEDURE  01/11/2011    Borderline test while off PPI; Day 1 suggestive of  reflux disease and Day 2 is negative     SECTION      X2    CHOLECYSTECTOMY      COLONOSCOPY  10/29/2015    One 5mm polyp in the sigmoid colon-complete resection, polyp tissue not retrieved; Examination was otherwise normal on direct and retroflexion views; Repeat 5 years     COLONOSCOPY  2003    Normal; Repeat at age 50    COLONOSCOPY  2010    Internal hemorrhoids; One 1cm polyp in the descending colon-path shows benign submucosal leiomyoma; Repeat 3 years    COLONOSCOPY  1992    Dr. Arredondo-Rox colonoscopy    COLONOSCOPY N/A 2021    One 5mm adenomatous polyp in the ascending colon; One 4mm adenomatous polyp in the transverse colon; Three 4-6mm polyps in the descending colon-path shows two adenomatous and one hyperplastic polyp; The examination was otherwise normal on direct and retroflexion views; Repeat 3 years    COLONOSCOPY N/A 2024    Procedure: COLONOSCOPY WITH ANESTHESIA;  Surgeon: Peggy Zuñiga MD;  Location: [facility] ENDOSCOPY;  Service: Gastroenterology;  Laterality: N/A;  pre op history of colon polyps  post polyps  pcp Radha Pepe    ENDOSCOPY  2011    Normal esophagus; Normal stomach; Normal examined duodenum; BRAVO capsule placed     ENDOSCOPY  2010    Suboptimal view due to retained gastric contents; Will check SPGE scan; Will repeat endo/MAC with BRAVO replacement    ENDOSCOPY  2003    Normal; RTC 3 months    ENDOSCOPY  1994    Dr. Morales endoscopy; Slant taken for H.Pylori    HYSTERECTOMY      KNEE ARTHROSCOPY      VENTRAL/INCISIONAL HERNIA REPAIR N/A 2016    Procedure: VENTRAL/INCISIONAL HERNIA REPAIR WITH MESH;  Surgeon: Mckenna Adair MD;  Location: [facility] OR;  Service:        Social History     Socioeconomic History    Marital status:    Tobacco Use    Smoking status: Never     Passive exposure: Past    Smokeless tobacco: Never   Vaping Use    Vaping status: Never Used   Substance and Sexual Activity    Alcohol  "use: Never    Drug use: Never    Sexual activity: Defer       Family History   Problem Relation Age of Onset    Heart disease Mother     Colon polyps Mother         > 60 years of age     Dementia Mother     Stroke Mother     Colon polyps Father         > 60 years of age     Hypertension Sister     Migraines Sister     Neuropathy Sister         Trigeminal neuralgia    Colon cancer Neg Hx     Esophageal cancer Neg Hx     Liver cancer Neg Hx     Liver disease Neg Hx     Rectal cancer Neg Hx     Stomach cancer Neg Hx        Objective    Temp 97.7 °F (36.5 °C)   Ht 165.1 cm (65\")   Wt 91.8 kg (202 lb 4.8 oz)   BMI 33.66 kg/m²     Physical Exam  Nursing note reviewed.   Constitutional:       General: She is not in acute distress.     Appearance: Normal appearance. She is not ill-appearing.   HENT:      Nose: No congestion.   Abdominal:      Tenderness: There is no right CVA tenderness or left CVA tenderness.      Hernia: No hernia is present.   Skin:     General: Skin is warm and dry.   Neurological:      Mental Status: She is alert and oriented to person, place, and time.   Psychiatric:         Mood and Affect: Mood normal.         Behavior: Behavior normal.             Results for orders placed or performed in visit on 02/20/25   POC Urinalysis Dipstick, Multipro    Collection Time: 03/04/25  9:53 AM    Specimen: Urine   Result Value Ref Range    Color Yellow Yellow, Straw, Dark Yellow, Jaqueline    Clarity, UA Clear Clear    Glucose, UA Negative Negative mg/dL    Bilirubin Negative Negative    Ketones, UA Negative Negative    Specific Gravity  1.010 1.005 - 1.030    Blood, UA Trace (A) Negative    pH, Urine 6.0 5.0 - 8.0    Protein, POC Negative Negative mg/dL    Urobilinogen, UA 0.2 E.U./dL Normal, 0.2 E.U./dL    Nitrite, UA Negative Negative    Leukocytes Large (3+) (A) Negative     Assessment and Plan    Diagnoses and all orders for this visit:    1. Urinary hesitancy [R39.11] (Primary)      Medications Solifenacin " has worked well from a urinary standpoint as patient is no longer requiring pad usage and is able to go 3 hours in between voids however patient unable to tolerate due to severe dryness from head to toe.  Patient experienced the same symptoms with the oxybutynin as well.  We will now stop the Solifenacin and try patient on Gemtesa samples    Will have follow-up in 6 weeks

## 2025-03-04 ENCOUNTER — OFFICE VISIT (OUTPATIENT)
Dept: UROLOGY | Facility: CLINIC | Age: 65
End: 2025-03-04
Payer: COMMERCIAL

## 2025-03-04 VITALS — WEIGHT: 202.3 LBS | HEIGHT: 65 IN | TEMPERATURE: 97.7 F | BODY MASS INDEX: 33.7 KG/M2

## 2025-03-04 DIAGNOSIS — R39.11 URINARY HESITANCY: Primary | ICD-10-CM

## 2025-04-07 ENCOUNTER — OFFICE VISIT (OUTPATIENT)
Dept: OTOLARYNGOLOGY | Facility: CLINIC | Age: 65
End: 2025-04-07
Payer: COMMERCIAL

## 2025-04-07 VITALS — BODY MASS INDEX: 33.28 KG/M2 | WEIGHT: 200 LBS

## 2025-04-07 DIAGNOSIS — K13.70 LESION OF ORAL MUCOSA: Primary | ICD-10-CM

## 2025-04-07 PROCEDURE — 99213 OFFICE O/P EST LOW 20 MIN: CPT | Performed by: NURSE PRACTITIONER

## 2025-04-07 RX ORDER — VIBEGRON 75 MG/1
1 TABLET, FILM COATED ORAL DAILY
COMMUNITY

## 2025-04-07 NOTE — PROGRESS NOTES
JEREMÍAS Sanchez ENT [facility] EAR NOSE & THROAT  [address], [address]  [place] [address]  Fax [phone]  Phone [phone]      Visit Type: NEW PATIENT   Chief Complaint   Patient presents with    Mouth Lesions     Pt complains of lesion in mouth that has been present since December            HISTORY OBTAINED FROM: patient  HPI  She complains of an oral lesion. The lesion is located of the lower lip The patient has had mild to moderate symptoms. The lesion does not burn or bleed. It does get irritated and have red spots. The symptoms have been present since December. There have been no identified factors that aggravate the symptoms. There have been no factors that have improved the symptoms. Patient is negative for autoimmune disorders. She does have fibromyalgia.        Past Medical History:   Diagnosis Date    Arthritis     Asthma     Cancer     skin ca    Carotid artery occlusion 01/31/2023    Chronic kidney disease, stage 3a     Cough     Multifactorial and adversely affected by shy and obesity    Degenerative disc disease, lumbar     Degenerative disc disease, thoracic     Depression     Family history of colonic polyps     Fibromyalgia, primary     Functional dyspepsia     GERD (gastroesophageal reflux disease)     Head injury 01/2023    Showed on mri    Headache, tension-type     Years I have had HA    History of adenomatous polyp of colon     History of colon polyps     History of squamous cell carcinoma 11/29/2021    Hypercholesteremia     Hypertension     Memory loss 12/2022    I think memory going a couple years ago    Sleep apnea     CPAP    Spinal headache     Syncope     Vision loss     Wear glasses    Wears glasses        Past Surgical History:   Procedure Laterality Date    ANTERIOR CERVICAL FUSION  09/09/2016    BRAVO PROCEDURE  01/11/2011    Borderline test while off PPI; Day 1 suggestive of reflux disease and Day 2 is negative      SECTION      X2    CHOLECYSTECTOMY      COLONOSCOPY  10/29/2015    One 5mm polyp in the sigmoid colon-complete resection, polyp tissue not retrieved; Examination was otherwise normal on direct and retroflexion views; Repeat 5 years     COLONOSCOPY  2003    Normal; Repeat at age 50    COLONOSCOPY  2010    Internal hemorrhoids; One 1cm polyp in the descending colon-path shows benign submucosal leiomyoma; Repeat 3 years    COLONOSCOPY  1992    Dr. Arredondo-Rox colonoscopy    COLONOSCOPY N/A 2021    One 5mm adenomatous polyp in the ascending colon; One 4mm adenomatous polyp in the transverse colon; Three 4-6mm polyps in the descending colon-path shows two adenomatous and one hyperplastic polyp; The examination was otherwise normal on direct and retroflexion views; Repeat 3 years    COLONOSCOPY N/A 2024    Procedure: COLONOSCOPY WITH ANESTHESIA;  Surgeon: Peggy Zuñiga MD;  Location: [facility] ENDOSCOPY;  Service: Gastroenterology;  Laterality: N/A;  pre op history of colon polyps  post polyps  pcp Radha Pepe    ENDOSCOPY  2011    Normal esophagus; Normal stomach; Normal examined duodenum; BRAVO capsule placed     ENDOSCOPY  2010    Suboptimal view due to retained gastric contents; Will check SPGE scan; Will repeat endo/MAC with BRAVO replacement    ENDOSCOPY  2003    Normal; RTC 3 months    ENDOSCOPY  1994    Dr. Morales endoscopy; Slant taken for H.Pylori    HYSTERECTOMY      KNEE ARTHROSCOPY      VENTRAL/INCISIONAL HERNIA REPAIR N/A 2016    Procedure: VENTRAL/INCISIONAL HERNIA REPAIR WITH MESH;  Surgeon: Mckenna Adair MD;  Location: [facility] OR;  Service:        Family History: Her family history includes Colon polyps in her father and mother; Dementia in her mother; Heart disease in her mother; Hypertension in her sister; Migraines in her sister; Neuropathy in her sister; Stroke in her mother.     Social History: She  reports that she has  never smoked. She has been exposed to tobacco smoke. She has never used smokeless tobacco. She reports that she does not drink alcohol and does not use drugs.    Home Medications:  DULoxetine, Magic Mouthwash Oral Suspension (diphenhydrAMINE HCl - aluminum & magnesium hydroxide-simethicone - lidocaine - nystatin), Vibegron, aspirin, buPROPion XL, budesonide-formoterol, busPIRone, cyclobenzaprine, docusate sodium, fenofibrate, losartan, montelukast, pantoprazole, polyethylene glycol, pregabalin, simvastatin, sodium bicarbonate, vitamin D, and vitamin D3    Allergies:  She is allergic to tetracyclines & related, erythromycin, and penicillins.       Vital Signs:      ENT Physical Exam  Constitutional  Appearance: patient appears well-developed,  Communication/Voice: communication appropriate for developmental age;  Head and Face  Appearance: head appears normal, face appears normal and face appears atraumatic;  Ear  Hearing: intact;  Auricles: right auricle normal; left auricle normal;  External Mastoids: right external mastoid normal; left external mastoid normal;  Ear Canals: right ear canal normal; left ear canal normal;  Tympanic Membranes: right tympanic membrane normal; left tympanic membrane normal;  Nose  External Nose: nares patent bilaterally; external nose normal;  Internal Nose: nasal mucosa normal;  Oral Cavity/Oropharynx  Lips: lip lesion (intraoral ridge with small red round lesion, no irritation or bleeding seen) present;  Teeth: normal;  Gums: gingiva normal;  Tongue: normal;  Oral mucosa: normal;  Hard palate: normal;  Soft palate: normal;  Tonsils: normal;  Base of Tongue: normal;  Posterior pharyngeal wall: normal;  Neck  Neck: neck normal;  Respiratory  Inspection: breathing unlabored;  Cardiovascular  Inspection: extremities are warm and well perfused;  Neurovestibular  Mental Status: alert and oriented;  Psychiatric: mood normal; affect is appropriate;           Result Review       RESULTS REVIEW     I have reviewed the patients old records in the chart.           Assessment & Plan  Lesion of oral mucosa         Conservative management. Due to no inflamed lesion today, we will treat conservatively with magic mouthwash. She will return to see Dr. Pastrana and call if mouth becomes inflamed.   Return in about 4 weeks (around 5/5/2025).        Electronically signed by JEREMÍAS Sanchez 04/07/25 12:59 PM CDT.

## 2025-04-09 NOTE — PROGRESS NOTES
Subjective    Ms. Regan is 64 y.o. female    Chief Complaint: 6-week follow-up overactive bladder urge incontinence    History of Present Illness    64-year-old female established patient in for 6-week follow-up regarding overactive bladder urge incontinence.  Previously tried and failed both oxybutynin and solifenacin.  Reports had been urinating every 45 minutes to an hour in addition to inability to make it to the bathroom in time resulting in anywhere from 5-10 pads per day.  Accompanied with nocturia x 2.  Was switched to Gemtesa, now reporting has only experienced 1 leak since started medication and no longer experiencing any nocturia and is able to go 2 to 3 hours in between voids. no longer requiring pad usage.  Patient very satisfied with results.    The following portions of the patient's history were reviewed and updated as appropriate: allergies, current medications, past family history, past medical history, past social history, past surgical history and problem list.    Review of Systems   Constitutional:  Negative for chills, fatigue and fever.   Genitourinary:  Negative for frequency and urgency.         Current Outpatient Medications:     aspirin 81 MG EC tablet, Take 1 tablet by mouth Daily. (Patient taking differently: Take 1 tablet by mouth Daily. Bela VERONICA), Disp: , Rfl:     buPROPion XL (WELLBUTRIN XL) 150 MG 24 hr tablet, Take 1 tablet by mouth Daily., Disp: , Rfl:     busPIRone (BUSPAR) 5 MG tablet, Take 1 tablet by mouth 2 (Two) Times a Day., Disp: , Rfl:     cyclobenzaprine (FLEXERIL) 10 MG tablet, Take 1 tablet by mouth 2 (Two) Times a Day As Needed for Muscle Spasms., Disp: , Rfl:     docusate sodium (COLACE) 100 MG capsule, Take 1 capsule by mouth As Needed for Constipation., Disp: , Rfl:     DULoxetine (CYMBALTA) 60 MG capsule, Take 2 capsules by mouth Daily., Disp: , Rfl:     fenofibrate (TRICOR) 145 MG tablet, Take 1 tablet by mouth Daily., Disp: , Rfl:     losartan  (COZAAR) 25 MG tablet, Take 1 tablet by mouth Daily., Disp: 90 tablet, Rfl: 0    Magic Mouthwash Oral Suspension (diphenhydrAMINE HCl - aluminum & magnesium hydroxide-simethicone - lidocaine - nystatin), Swish and spit 5 mL Every 4 (Four) Hours As Needed for Mucositis for up to 30 days., Disp: 300 mL, Rfl: 1    montelukast (SINGULAIR) 10 MG tablet, Take 1 tablet by mouth Daily., Disp: , Rfl:     pantoprazole (PROTONIX) 40 MG EC tablet, Take 1 tablet by mouth 2 (Two) Times a Day., Disp: , Rfl:     polyethylene glycol (MiraLax) 17 g packet, Take 17 g by mouth As Needed., Disp: , Rfl:     simvastatin (ZOCOR) 40 MG tablet, Take 1 tablet by mouth Daily., Disp: , Rfl:     sodium bicarbonate 325 MG tablet, Take 1 tablet by mouth 2 (Two) Times a Day., Disp: , Rfl:     Symbicort 160-4.5 MCG/ACT inhaler, Inhale 1 puff 2 (Two) Times a Day., Disp: , Rfl:     Vibegron (Gemtesa) 75 MG tablet, Take 1 tablet by mouth Daily., Disp: 30 tablet, Rfl: 11    vitamin D (ERGOCALCIFEROL) 1.25 MG (20768 UT) capsule capsule, Take 1 capsule by mouth 1 (One) Time Per Week., Disp: , Rfl:     vitamin D3 125 MCG (5000 UT) capsule capsule, Take 1 capsule by mouth Daily., Disp: , Rfl:     pregabalin (Lyrica) 100 MG capsule, 1 capsule Every 8 (Eight) Hours., Disp: , Rfl:     Past Medical History:   Diagnosis Date    Arthritis     Asthma     Cancer     skin ca    Carotid artery occlusion 01/31/2023    Chronic kidney disease, stage 3a     Cough     Multifactorial and adversely affected by shy and obesity    Degenerative disc disease, lumbar     Degenerative disc disease, thoracic     Depression     Family history of colonic polyps     Fibromyalgia, primary     Functional dyspepsia     GERD (gastroesophageal reflux disease)     Head injury 01/2023    Showed on mri    Headache, tension-type     Years I have had HA    History of adenomatous polyp of colon     History of colon polyps     History of squamous cell carcinoma 11/29/2021    Hypercholesteremia      Hypertension     Memory loss 2022    I think memory going a couple years ago    Sleep apnea     CPAP    Spinal headache     Syncope     Vision loss     Wear glasses    Wears glasses        Past Surgical History:   Procedure Laterality Date    ANTERIOR CERVICAL FUSION  2016    BRAVO PROCEDURE  2011    Borderline test while off PPI; Day 1 suggestive of reflux disease and Day 2 is negative     SECTION      X2    CHOLECYSTECTOMY      COLONOSCOPY  10/29/2015    One 5mm polyp in the sigmoid colon-complete resection, polyp tissue not retrieved; Examination was otherwise normal on direct and retroflexion views; Repeat 5 years     COLONOSCOPY  2003    Normal; Repeat at age 50    COLONOSCOPY  2010    Internal hemorrhoids; One 1cm polyp in the descending colon-path shows benign submucosal leiomyoma; Repeat 3 years    COLONOSCOPY  1992    Dr. Arredondo-Normal colonoscopy    COLONOSCOPY N/A 2021    One 5mm adenomatous polyp in the ascending colon; One 4mm adenomatous polyp in the transverse colon; Three 4-6mm polyps in the descending colon-path shows two adenomatous and one hyperplastic polyp; The examination was otherwise normal on direct and retroflexion views; Repeat 3 years    COLONOSCOPY N/A 2024    Procedure: COLONOSCOPY WITH ANESTHESIA;  Surgeon: Peggy Zuñiga MD;  Location: [facility] ENDOSCOPY;  Service: Gastroenterology;  Laterality: N/A;  pre op history of colon polyps  post polyps  pcp Radha Pepe    ENDOSCOPY  2011    Normal esophagus; Normal stomach; Normal examined duodenum; BRAVO capsule placed     ENDOSCOPY  2010    Suboptimal view due to retained gastric contents; Will check SPGE scan; Will repeat endo/MAC with BRAVO replacement    ENDOSCOPY  2003    Normal; RTC 3 months    ENDOSCOPY  1994    Dr. Arredondo-Rox endoscopy; Slant taken for H.Pylori    HYSTERECTOMY      KNEE ARTHROSCOPY      VENTRAL/INCISIONAL HERNIA REPAIR N/A 2016     Procedure: VENTRAL/INCISIONAL HERNIA REPAIR WITH MESH;  Surgeon: Mckenna Adair MD;  Location: [facility] OR;  Service:        Social History     Socioeconomic History    Marital status:    Tobacco Use    Smoking status: Never     Passive exposure: Past    Smokeless tobacco: Never   Vaping Use    Vaping status: Never Used   Substance and Sexual Activity    Alcohol use: Never    Drug use: Never    Sexual activity: Defer       Family History   Problem Relation Age of Onset    Heart disease Mother     Colon polyps Mother         > 60 years of age     Dementia Mother     Stroke Mother     Colon polyps Father         > 60 years of age     Hypertension Sister     Migraines Sister     Neuropathy Sister         Trigeminal neuralgia    Colon cancer Neg Hx     Esophageal cancer Neg Hx     Liver cancer Neg Hx     Liver disease Neg Hx     Rectal cancer Neg Hx     Stomach cancer Neg Hx        Objective    There were no vitals taken for this visit.    Physical Exam  Nursing note reviewed.   Constitutional:       General: She is not in acute distress.     Appearance: Normal appearance. She is not ill-appearing.   HENT:      Nose: No congestion.   Abdominal:      Tenderness: There is no right CVA tenderness or left CVA tenderness.      Hernia: No hernia is present.   Skin:     General: Skin is warm and dry.   Neurological:      Mental Status: She is alert and oriented to person, place, and time.   Psychiatric:         Mood and Affect: Mood normal.         Behavior: Behavior normal.             Results for orders placed or performed in visit on 04/15/25   POC Urinalysis Dipstick, Multipro    Collection Time: 04/15/25  9:31 AM    Specimen: Urine   Result Value Ref Range    Color Yellow Yellow, Straw, Dark Yellow, Jaqueline    Clarity, UA Clear Clear    Glucose, UA Negative Negative mg/dL    Bilirubin Negative Negative    Ketones, UA Negative Negative    Specific Gravity  1.015 1.005 - 1.030    Blood, UA Negative Negative    pH,  Urine 7.0 5.0 - 8.0    Protein, POC Negative Negative mg/dL    Urobilinogen, UA 0.2 E.U./dL Normal, 0.2 E.U./dL    Nitrite, UA Negative Negative    Leukocytes Negative Negative     Assessment and Plan    Diagnoses and all orders for this visit:    1. Urge incontinence (Primary)  -     POC Urinalysis Dipstick, Multipro  -     Vibegron (Gemtesa) 75 MG tablet; Take 1 tablet by mouth Daily.  Dispense: 30 tablet; Refill: 11      Failed oxybutynin and solifenacin.    Gemtesa working wonders    No longer requiring any pad usage has only experienced 1 leak is now voiding every 2-3 hours    We will continue Gemtesa.  I will send in a prescription and complete the prior authorization      Follow-up 6 months

## 2025-04-15 ENCOUNTER — OFFICE VISIT (OUTPATIENT)
Dept: UROLOGY | Facility: CLINIC | Age: 65
End: 2025-04-15
Payer: COMMERCIAL

## 2025-04-15 DIAGNOSIS — N39.41 URGE INCONTINENCE: Primary | ICD-10-CM

## 2025-04-15 LAB
BILIRUB BLD-MCNC: NEGATIVE MG/DL
CLARITY, POC: CLEAR
COLOR UR: YELLOW
GLUCOSE UR STRIP-MCNC: NEGATIVE MG/DL
KETONES UR QL: NEGATIVE
LEUKOCYTE EST, POC: NEGATIVE
NITRITE UR-MCNC: NEGATIVE MG/ML
PH UR: 7 [PH] (ref 5–8)
PROT UR STRIP-MCNC: NEGATIVE MG/DL
RBC # UR STRIP: NEGATIVE /UL
SP GR UR: 1.01 (ref 1–1.03)
UROBILINOGEN UR QL: NORMAL

## 2025-04-15 RX ORDER — VIBEGRON 75 MG/1
1 TABLET, FILM COATED ORAL DAILY
Qty: 30 TABLET | Refills: 11 | Status: SHIPPED | OUTPATIENT
Start: 2025-04-15

## 2025-04-29 ENCOUNTER — OFFICE VISIT (OUTPATIENT)
Dept: GASTROENTEROLOGY | Facility: CLINIC | Age: 65
End: 2025-04-29
Payer: COMMERCIAL

## 2025-04-29 VITALS
BODY MASS INDEX: 34.16 KG/M2 | HEIGHT: 65 IN | DIASTOLIC BLOOD PRESSURE: 78 MMHG | WEIGHT: 205 LBS | HEART RATE: 81 BPM | OXYGEN SATURATION: 97 % | SYSTOLIC BLOOD PRESSURE: 116 MMHG | TEMPERATURE: 98 F

## 2025-04-29 DIAGNOSIS — R13.19 ESOPHAGEAL DYSPHAGIA: Primary | ICD-10-CM

## 2025-04-29 PROCEDURE — 99214 OFFICE O/P EST MOD 30 MIN: CPT | Performed by: NURSE PRACTITIONER

## 2025-04-29 NOTE — H&P (VIEW-ONLY)
Primary Physician: Radha Pepe APRN    Chief Complaint   Patient presents with    Difficulty Swallowing     Pt c/o trouble swallowing for the last couple of months-states about 2 months ago food go stuck in her throat to the point she couldn't breathe-states since that time she has had intermittent issues feeling like food gets hung and won't go down; Pt's last endo was 1/11/2011       Subjective     Peggy Regan is a 64 y.o. female.    HPI  Dysphagia  Pt having issues with foods feeling as though they hang in her upper esophagus.  A couple months ago she had an episode where food was completely lodged and she had to perform the Heimlich on herself. Since that time she has had intermittent episodes of dysphagia with foods. No trouble swallowing liquids.  Pt does take Protonix daily and that keeps her acid reflux under control.    1/12/2024 GFR 67  Endoscopy collected in 1994      Hx Adenomatous Colon Polyps  Pt up to date on colonoscopy. 7/31/2024 Colonoscopy: 8mm polyp removed cecum & multiple polyps from the TC.  Adenomatous polyps removed in 2021.    Past Medical History:   Diagnosis Date    Arthritis     Asthma     Carotid artery occlusion 01/31/2023    Chronic kidney disease, stage 3a     Cough     Multifactorial and adversely affected by shy and obesity    Degenerative disc disease, lumbar     Degenerative disc disease, thoracic     Depression     Family history of colonic polyps     Fibromyalgia, primary     Functional dyspepsia     GERD (gastroesophageal reflux disease)     Head injury 01/2023    Showed on mri    Headache, tension-type     Years I have had HA    History of adenomatous polyp of colon     History of colon polyps     History of skin cancer     History of squamous cell carcinoma 11/29/2021    Hypercholesteremia     Hypertension     Memory loss 12/2022    I think memory going a couple years ago    Sleep apnea     CPAP    Spinal headache     Syncope     Vision loss     Wear glasses     Wears glasses        Past Surgical History:   Procedure Laterality Date    ANTERIOR CERVICAL FUSION  2016    BRAVO PROCEDURE  2011    Borderline test while off PPI; Day 1 suggestive of reflux disease and Day 2 is negative     SECTION      X2    CHOLECYSTECTOMY      COLONOSCOPY  10/29/2015    One 5mm polyp in the sigmoid colon-complete resection, polyp tissue not retrieved; Examination was otherwise normal on direct and retroflexion views; Repeat 5 years     COLONOSCOPY  2003    Normal; Repeat at age 50    COLONOSCOPY  2010    Internal hemorrhoids; One 1cm polyp in the descending colon-path shows benign submucosal leiomyoma; Repeat 3 years    COLONOSCOPY  1992    Dr. Arredondo-Rox colonoscopy    COLONOSCOPY N/A 2021    One 5mm adenomatous polyp in the ascending colon; One 4mm adenomatous polyp in the transverse colon; Three 4-6mm polyps in the descending colon-path shows two adenomatous and one hyperplastic polyp; The examination was otherwise normal on direct and retroflexion views; Repeat 3 years    COLONOSCOPY N/A 2024    One 8mm tubular adenomatous polyp in the cecum; One 7mm tubular adenomatous polyp in the transverse colon; Four 4-6mm tubular adenomatous polyps in the transverse colon; Repeat 3 years    ENDOSCOPY  2011    Normal esophagus; Normal stomach; Normal examined duodenum; BRAVO capsule placed     ENDOSCOPY  2010    Suboptimal view due to retained gastric contents; Will check SPGE scan; Will repeat endo/MAC with BRAVO replacement    ENDOSCOPY  2003    Normal; RTC 3 months    ENDOSCOPY  1994    Dr. Arredondo-Rox endoscopy; Slant taken for H.Pylori    HYSTERECTOMY      KNEE ARTHROSCOPY      VENTRAL/INCISIONAL HERNIA REPAIR N/A 2016    Procedure: VENTRAL/INCISIONAL HERNIA REPAIR WITH MESH;  Surgeon: Mckenna Adair MD;  Location: [facility] OR;  Service:         Current Outpatient Medications:     aspirin 81 MG EC tablet, Take 1  tablet by mouth Daily. (Patient taking differently: Take 1 tablet by mouth Daily. Bela VERONICA), Disp: , Rfl:     buPROPion XL (WELLBUTRIN XL) 150 MG 24 hr tablet, Take 1 tablet by mouth Daily., Disp: , Rfl:     busPIRone (BUSPAR) 5 MG tablet, Take 1 tablet by mouth 2 (Two) Times a Day., Disp: , Rfl:     Cholecalciferol (Vitamin D3) 75 MCG (3000 UT) tablet, Take 1 capsule by mouth Daily., Disp: , Rfl:     cyclobenzaprine (FLEXERIL) 10 MG tablet, Take 1 tablet by mouth 2 (Two) Times a Day., Disp: , Rfl:     docusate sodium (COLACE) 100 MG capsule, Take 1 capsule by mouth As Needed for Constipation., Disp: , Rfl:     DULoxetine (CYMBALTA) 60 MG capsule, Take 2 capsules by mouth Daily., Disp: , Rfl:     fenofibrate (TRICOR) 145 MG tablet, Take 1 tablet by mouth Daily., Disp: , Rfl:     losartan (COZAAR) 25 MG tablet, Take 1 tablet by mouth Daily., Disp: 90 tablet, Rfl: 0    Magic Mouthwash Oral Suspension (diphenhydrAMINE HCl - aluminum & magnesium hydroxide-simethicone - lidocaine - nystatin), Swish and spit 5 mL Every 4 (Four) Hours As Needed for Mucositis for up to 30 days., Disp: 300 mL, Rfl: 1    montelukast (SINGULAIR) 10 MG tablet, Take 1 tablet by mouth Daily., Disp: , Rfl:     pantoprazole (PROTONIX) 40 MG EC tablet, Take 1 tablet by mouth 2 (Two) Times a Day., Disp: , Rfl:     polyethylene glycol (MiraLax) 17 g packet, Take 17 g by mouth Daily., Disp: , Rfl:     pregabalin (Lyrica) 100 MG capsule, Take 1 capsule by mouth 3 times a day., Disp: , Rfl:     simvastatin (ZOCOR) 40 MG tablet, Take 1 tablet by mouth Daily., Disp: , Rfl:     sodium bicarbonate 325 MG tablet, Take 1 tablet by mouth 2 (Two) Times a Day., Disp: , Rfl:     Symbicort 160-4.5 MCG/ACT inhaler, Inhale 1 puff 2 (Two) Times a Day., Disp: , Rfl:     Vibegron (Gemtesa) 75 MG tablet, Take 1 tablet by mouth Daily., Disp: 30 tablet, Rfl: 11    Allergies   Allergen Reactions    Tetracyclines & Related Swelling     Mouth swelling     "Erythromycin Nausea And Vomiting    Penicillins Hives       Social History     Socioeconomic History    Marital status:    Tobacco Use    Smoking status: Never     Passive exposure: Past    Smokeless tobacco: Never   Vaping Use    Vaping status: Never Used   Substance and Sexual Activity    Alcohol use: Never    Drug use: Never    Sexual activity: Defer       Family History   Problem Relation Age of Onset    Heart disease Mother     Colon polyps Mother         > 60 years of age     Dementia Mother     Stroke Mother     Colon polyps Father         > 60 years of age     Hypertension Sister     Migraines Sister     Neuropathy Sister         Trigeminal neuralgia    Colon cancer Neg Hx     Esophageal cancer Neg Hx     Liver cancer Neg Hx     Liver disease Neg Hx     Rectal cancer Neg Hx     Stomach cancer Neg Hx        Review of Systems   Constitutional:  Negative for unexpected weight change.   HENT:  Positive for trouble swallowing.        Objective     /78 (BP Location: Left arm, Patient Position: Sitting, Cuff Size: Adult)   Pulse 81   Temp 98 °F (36.7 °C) (Infrared)   Ht 165.1 cm (65\")   Wt 93 kg (205 lb)   SpO2 97%   Breastfeeding No   BMI 34.11 kg/m²     Physical Exam  Vitals reviewed.   Constitutional:       Appearance: Normal appearance.   Cardiovascular:      Rate and Rhythm: Normal rate and regular rhythm.      Heart sounds: Normal heart sounds.   Pulmonary:      Effort: Pulmonary effort is normal.      Breath sounds: Normal breath sounds.   Neurological:      Mental Status: She is alert.                 IMPRESSION/PLAN:    Assessment & Plan      Problem List Items Addressed This Visit       Esophageal dysphagia - Primary    Relevant Orders    Case Request (Completed)    Follow Anesthesia Guidelines / Protocol     Endoscopy per Dr Zuñiga  Pt instructed to eat slowly, chew foods thoroughly, make effort to repeat swallows of drinks between bites of food until her endoscopic evaluation.      "     ..The risks, benefits, and alternatives of endoscopy were reviewed with the patient today.  Risks including perforation, with or without dilation, possibly requiring surgery.  Additional risks include risk of bleeding.  There is also the risk of a drug reaction or problems with anesthesia.  This will be discussed with the further by the anesthesia team on the day of the procedure. The benefits include the diagnosis and management of disease of the upper digestive tract.  Alternatives to endoscopy include upper GI series, laboratory testing, radiographic evaluation, or no intervention.  The patient verbalizes understanding and agrees.    In accordance with requirements under the Affordable Care Act, [facility] has provided pricing for all hospital services and items on each of its websites. However, a patient's actual cost may differ based on the services the patient receives to meet individual healthcare needs and based on the benefits provided under the patient’s insurance coverage.        Ruth Andrews, APRN  04/29/25  10:18 CDT    Part of this note may be an electronic transcription/translation of spoken language to printed text.

## 2025-05-02 ENCOUNTER — ANESTHESIA (OUTPATIENT)
Dept: GASTROENTEROLOGY | Facility: HOSPITAL | Age: 65
End: 2025-05-02
Payer: COMMERCIAL

## 2025-05-02 ENCOUNTER — HOSPITAL ENCOUNTER (OUTPATIENT)
Facility: HOSPITAL | Age: 65
Setting detail: HOSPITAL OUTPATIENT SURGERY
Discharge: HOME | End: 2025-05-02
Attending: INTERNAL MEDICINE | Admitting: INTERNAL MEDICINE
Payer: COMMERCIAL

## 2025-05-02 ENCOUNTER — ANESTHESIA EVENT (OUTPATIENT)
Dept: GASTROENTEROLOGY | Facility: HOSPITAL | Age: 65
End: 2025-05-02
Payer: COMMERCIAL

## 2025-05-02 VITALS
WEIGHT: 201 LBS | HEART RATE: 84 BPM | BODY MASS INDEX: 33.49 KG/M2 | SYSTOLIC BLOOD PRESSURE: 130 MMHG | OXYGEN SATURATION: 96 % | DIASTOLIC BLOOD PRESSURE: 74 MMHG | TEMPERATURE: 97.5 F | HEIGHT: 65 IN | RESPIRATION RATE: 18 BRPM

## 2025-05-02 DIAGNOSIS — R13.19 ESOPHAGEAL DYSPHAGIA: ICD-10-CM

## 2025-05-02 PROBLEM — K57.10 DUODENAL DIVERTICULUM: Status: ACTIVE | Noted: 2025-05-02

## 2025-05-02 PROCEDURE — 25810000003 SODIUM CHLORIDE 0.9 % SOLUTION: Performed by: ANESTHESIOLOGY

## 2025-05-02 PROCEDURE — 43249 ESOPH EGD DILATION <30 MM: CPT | Performed by: INTERNAL MEDICINE

## 2025-05-02 PROCEDURE — 25010000002 LIDOCAINE PF 2% 2 % SOLUTION: Performed by: NURSE ANESTHETIST, CERTIFIED REGISTERED

## 2025-05-02 PROCEDURE — 25010000002 PROPOFOL 10 MG/ML EMULSION: Performed by: NURSE ANESTHETIST, CERTIFIED REGISTERED

## 2025-05-02 PROCEDURE — 88342 IMHCHEM/IMCYTCHM 1ST ANTB: CPT | Performed by: INTERNAL MEDICINE

## 2025-05-02 PROCEDURE — 43239 EGD BIOPSY SINGLE/MULTIPLE: CPT | Performed by: INTERNAL MEDICINE

## 2025-05-02 PROCEDURE — 88305 TISSUE EXAM BY PATHOLOGIST: CPT | Performed by: INTERNAL MEDICINE

## 2025-05-02 PROCEDURE — C1726 CATH, BAL DIL, NON-VASCULAR: HCPCS | Performed by: INTERNAL MEDICINE

## 2025-05-02 RX ORDER — SODIUM CHLORIDE 0.9 % (FLUSH) 0.9 %
10 SYRINGE (ML) INJECTION AS NEEDED
Status: DISCONTINUED | OUTPATIENT
Start: 2025-05-02 | End: 2025-05-02 | Stop reason: HOSPADM

## 2025-05-02 RX ORDER — LIDOCAINE HYDROCHLORIDE 20 MG/ML
INJECTION, SOLUTION EPIDURAL; INFILTRATION; INTRACAUDAL; PERINEURAL AS NEEDED
Status: DISCONTINUED | OUTPATIENT
Start: 2025-05-02 | End: 2025-05-02 | Stop reason: SURG

## 2025-05-02 RX ORDER — PROPOFOL 10 MG/ML
VIAL (ML) INTRAVENOUS AS NEEDED
Status: DISCONTINUED | OUTPATIENT
Start: 2025-05-02 | End: 2025-05-02 | Stop reason: SURG

## 2025-05-02 RX ORDER — SODIUM CHLORIDE 9 MG/ML
500 INJECTION, SOLUTION INTRAVENOUS CONTINUOUS PRN
Status: DISCONTINUED | OUTPATIENT
Start: 2025-05-02 | End: 2025-05-02 | Stop reason: HOSPADM

## 2025-05-02 RX ADMIN — PROPOFOL 100 MG: 10 INJECTION, EMULSION INTRAVENOUS at 08:09

## 2025-05-02 RX ADMIN — PROPOFOL 100 MG: 10 INJECTION, EMULSION INTRAVENOUS at 08:07

## 2025-05-02 RX ADMIN — PROPOFOL 50 MG: 10 INJECTION, EMULSION INTRAVENOUS at 08:14

## 2025-05-02 RX ADMIN — SODIUM CHLORIDE 500 ML: 9 INJECTION, SOLUTION INTRAVENOUS at 07:57

## 2025-05-02 RX ADMIN — LIDOCAINE HYDROCHLORIDE 100 MG: 20 INJECTION, SOLUTION EPIDURAL; INFILTRATION; INTRACAUDAL; PERINEURAL at 08:07

## 2025-05-02 NOTE — ANESTHESIA POSTPROCEDURE EVALUATION
"Patient: Peggy Regan    Procedure Summary       Date: 05/02/25 Room / Location: [facility] ENDOSCOPY 4 / [facility] ENDOSCOPY    Anesthesia Start: 0803 Anesthesia Stop: 0820    Procedure: ESOPHAGOGASTRODUODENOSCOPY WITH ANESTHESIA Diagnosis:       Esophageal dysphagia      (Esophageal dysphagia [R13.19])    Surgeons: Peggy Zuñiga MD Provider: Clement Jenkins CRNA    Anesthesia Type: MAC ASA Status: 3            Anesthesia Type: MAC    Vitals  Vitals Value Taken Time   /74 05/02/25 08:45   Temp     Pulse 84 05/02/25 08:45   Resp 18 05/02/25 08:45   SpO2 96 % 05/02/25 08:45           Post Anesthesia Care and Evaluation    Patient location during evaluation: PHASE II  Patient participation: complete - patient participated  Level of consciousness: awake and awake and alert  Pain score: 0  Pain management: adequate    Airway patency: patent  Anesthetic complications: No anesthetic complications  PONV Status: none  Cardiovascular status: acceptable  Respiratory status: acceptable  Hydration status: acceptable    Comments: Patient discharged according to acceptable Charly score per RN assessment. See nursing records for further information.     Blood pressure 130/74, pulse 84, temperature 97.5 °F (36.4 °C), temperature source Temporal, resp. rate 18, height 165.1 cm (65\"), weight 91.2 kg (201 lb), SpO2 96%, not currently breastfeeding.      "

## 2025-05-06 ENCOUNTER — OFFICE VISIT (OUTPATIENT)
Dept: OTOLARYNGOLOGY | Facility: CLINIC | Age: 65
End: 2025-05-06
Payer: MEDICARE

## 2025-05-06 VITALS — WEIGHT: 201 LBS | HEIGHT: 65 IN | BODY MASS INDEX: 33.49 KG/M2

## 2025-05-06 DIAGNOSIS — K13.70 LESION OF ORAL MUCOSA: Primary | ICD-10-CM

## 2025-05-06 NOTE — PROGRESS NOTES
YOB: 1960  Location: [facility]  Location Address: [address] [place] [address], [facility], [address] [place] [address]  Location Phone: [phone]    Chief Complaint   Patient presents with    Lip Lesion       History of Present Illness  Peggy Regan is a 64 y.o. female.  Peggy Regan is here for follow up of ENT complaints. The patient has had problems with lesion.  This is localized to the bottom lip.  At the last office visit she was started on Magic mouthwash. She states that she has intermittent tenderness to the area that does not last more than 1-2 days.      Past Medical History:   Diagnosis Date    Arthritis     Asthma     Carotid artery occlusion 2023    Chronic kidney disease, stage 3a     Cough     Multifactorial and adversely affected by shy and obesity    Degenerative disc disease, lumbar     Degenerative disc disease, thoracic     Depression     Family history of colonic polyps     Fibromyalgia, primary     Functional dyspepsia     GERD (gastroesophageal reflux disease)     Head injury 2023    Showed on mri    Headache, tension-type     Years I have had HA    History of adenomatous polyp of colon     History of colon polyps     History of skin cancer     History of squamous cell carcinoma 2021    Hypercholesteremia     Hypertension     Memory loss 2022    I think memory going a couple years ago    Sleep apnea     CPAP    Spinal headache     Syncope     Vision loss     Wear glasses    Wears glasses        Past Surgical History:   Procedure Laterality Date    ANTERIOR CERVICAL FUSION  2016    BRAVO PROCEDURE  2011    Borderline test while off PPI; Day 1 suggestive of reflux disease and Day 2 is negative     SECTION      X2    CHOLECYSTECTOMY      COLONOSCOPY  10/29/2015    One 5mm polyp in the sigmoid colon-complete resection, polyp tissue not retrieved; Examination was otherwise normal on direct and retroflexion views; Repeat 5 years     COLONOSCOPY   08/18/2003    Normal; Repeat at age 50    COLONOSCOPY  11/01/2010    Internal hemorrhoids; One 1cm polyp in the descending colon-path shows benign submucosal leiomyoma; Repeat 3 years    COLONOSCOPY  06/16/1992    Dr. Arredondo-Rox colonoscopy    COLONOSCOPY N/A 04/28/2021    One 5mm adenomatous polyp in the ascending colon; One 4mm adenomatous polyp in the transverse colon; Three 4-6mm polyps in the descending colon-path shows two adenomatous and one hyperplastic polyp; The examination was otherwise normal on direct and retroflexion views; Repeat 3 years    COLONOSCOPY N/A 07/31/2024    One 8mm tubular adenomatous polyp in the cecum; One 7mm tubular adenomatous polyp in the transverse colon; Four 4-6mm tubular adenomatous polyps in the transverse colon; Repeat 3 years    ENDOSCOPY  01/11/2011    Normal esophagus; Normal stomach; Normal examined duodenum; BRAVO capsule placed     ENDOSCOPY  12/01/2010    Suboptimal view due to retained gastric contents; Will check SPGE scan; Will repeat endo/MAC with BRAVO replacement    ENDOSCOPY  08/18/2003    Normal; RTC 3 months    ENDOSCOPY  09/22/1994    Dr. Morales endoscopy; Slant taken for H.Pylori    ENDOSCOPY N/A 5/2/2025    Procedure: ESOPHAGOGASTRODUODENOSCOPY WITH ANESTHESIA;  Surgeon: Peggy Zuñiga MD;  Location: [facility] ENDOSCOPY;  Service: Gastroenterology;  Laterality: N/A;  preop; dysphagia  postop duodenal diverticulum ; gastric polyps;   PCP Radha Pepe    HYSTERECTOMY      KNEE ARTHROSCOPY      VENTRAL/INCISIONAL HERNIA REPAIR N/A 12/28/2016    Procedure: VENTRAL/INCISIONAL HERNIA REPAIR WITH MESH;  Surgeon: Mckenna Adair MD;  Location: [facility] OR;  Service:        Outpatient Medications Marked as Taking for the 5/6/25 encounter (Office Visit) with Shaw Pastrana MD   Medication Sig Dispense Refill    aspirin 81 MG EC tablet Take 1 tablet by mouth Daily. (Patient taking differently: Take 1 tablet by mouth Daily. Bela VERONICA)       buPROPion XL (WELLBUTRIN XL) 150 MG 24 hr tablet Take 1 tablet by mouth Daily.      busPIRone (BUSPAR) 5 MG tablet Take 1 tablet by mouth 2 (Two) Times a Day.      Cholecalciferol (Vitamin D3) 75 MCG (3000 UT) tablet Take 1 capsule by mouth Daily.      cyclobenzaprine (FLEXERIL) 10 MG tablet Take 1 tablet by mouth 2 (Two) Times a Day.      docusate sodium (COLACE) 100 MG capsule Take 1 capsule by mouth As Needed for Constipation.      DULoxetine (CYMBALTA) 60 MG capsule Take 2 capsules by mouth Daily.      fenofibrate (TRICOR) 145 MG tablet Take 1 tablet by mouth Daily.      losartan (COZAAR) 25 MG tablet Take 1 tablet by mouth Daily. 90 tablet 0    montelukast (SINGULAIR) 10 MG tablet Take 1 tablet by mouth Daily.      pantoprazole (PROTONIX) 40 MG EC tablet Take 1 tablet by mouth 2 (Two) Times a Day.      polyethylene glycol (MiraLax) 17 g packet Take 17 g by mouth Daily.      simvastatin (ZOCOR) 40 MG tablet Take 1 tablet by mouth Daily.      sodium bicarbonate 325 MG tablet Take 1 tablet by mouth 2 (Two) Times a Day.      Symbicort 160-4.5 MCG/ACT inhaler Inhale 1 puff 2 (Two) Times a Day.      Vibegron (Gemtesa) 75 MG tablet Take 1 tablet by mouth Daily. 30 tablet 11       Tetracyclines & related, Erythromycin, and Penicillins    Family History   Problem Relation Age of Onset    Heart disease Mother     Colon polyps Mother         > 60 years of age     Dementia Mother     Stroke Mother     Colon polyps Father         > 60 years of age     Hypertension Sister     Migraines Sister     Neuropathy Sister         Trigeminal neuralgia    Colon cancer Neg Hx     Esophageal cancer Neg Hx     Liver cancer Neg Hx     Liver disease Neg Hx     Rectal cancer Neg Hx     Stomach cancer Neg Hx        Social History     Socioeconomic History    Marital status:    Tobacco Use    Smoking status: Never     Passive exposure: Past    Smokeless tobacco: Never   Vaping Use    Vaping status: Never Used   Substance and Sexual  Activity    Alcohol use: Never    Drug use: Never    Sexual activity: Defer       Review of Systems   Constitutional: Negative.    HENT:          Admits oral lesion       There were no vitals filed for this visit.    Body mass index is 33.45 kg/m².    Objective     Physical Exam  Vitals reviewed.   Constitutional:       Appearance: Normal appearance. She is normal weight.   HENT:      Head: Normocephalic.      Right Ear: Tympanic membrane, ear canal and external ear normal.      Left Ear: Tympanic membrane, ear canal and external ear normal.      Nose: Nose normal.      Mouth/Throat:      Comments: White lacy area to lower inner lip appearing as lichen planus  Neurological:      Mental Status: She is alert.   Psychiatric:         Behavior: Behavior is cooperative.         Assessment & Plan   Diagnoses and all orders for this visit:    1. Lesion of oral mucosa (Primary)      * Surgery not found *  No orders of the defined types were placed in this encounter.    Patient instructed to call if pain lasts more than a week or if it is progressively worse  Patient to call with any changes to lesion appearance  Return for problems    Return in about 2 months (around 7/6/2025) for Recheck with Dr. Pastrana.       Patient Instructions   CONTACT INFORMATION:  The main office phone number is [phone]. For emergencies after hours and on weekends, this number will convert over to our answering service and the on call provider will answer. Please try to keep non emergent phone calls/ questions to office hours 9am-5pm Monday through Friday.      Fur and Mask  As an alternative, you can sign up and use the Epic MyChart system for more direct and quicker access for non emergent questions/ problems.  [facility] and Mask allows you to send messages to your doctor, view your test results, renew your prescriptions, schedule appointments, and more. To sign up, go to HealthFusion and click on the Sign Up Now link in the New  User? box. Enter your Inf[employer] Activation Code exactly as it appears below along with the last four digits of your Social Security Number and your Date of Birth () to complete the sign-up process. If you do not sign up before the expiration date, you must request a new code.     Inf[employer] Activation Code: Activation code not generated  Current [employer]oor Status: Active     If you have questions, you can email [email or web address] or call [phone] to talk to our Inf[employer] staff. Remember, OpenDoor is NOT to be used for urgent needs. For medical emergencies, dial 911.     IF YOU SMOKE OR USE TOBACCO PLEASE READ THE FOLLOWING:  Why is smoking bad for me?  Smoking increases the risk of heart disease, lung disease, vascular disease, stroke, and cancer. If you smoke, STOP!        IF YOU SMOKE OR USE TOBACCO PLEASE READ THE FOLLOWING:  Why is smoking bad for me?  Smoking increases the risk of heart disease, lung disease, vascular disease, stroke, and cancer. If you smoke, STOP!     For more information:  Quit Now [place]  -QUIT-NOW  [email or web address]

## 2025-05-06 NOTE — PATIENT INSTRUCTIONS
CONTACT INFORMATION:  The main office phone number is [phone]. For emergencies after hours and on weekends, this number will convert over to our answering service and the on call provider will answer. Please try to keep non emergent phone calls/ questions to office hours 9am-5pm Monday through Friday.      [facility]  As an alternative, you can sign up and use the Epic MyChart system for more direct and quicker access for non emergent questions/ problems.  [facility] allows you to send messages to your doctor, view your test results, renew your prescriptions, schedule appointments, and more. To sign up, go to Indigio and click on the Sign Up Now link in the New User? box. Enter your [employer]o Activation Code exactly as it appears below along with the last four digits of your Social Security Number and your Date of Birth () to complete the sign-up process. If you do not sign up before the expiration date, you must request a new code.     [employer]o Activation Code: Activation code not generated  Current [employer]o Status: Active     If you have questions, you can email [email or web address] or call [phone] to talk to our Aprimo staff. Remember, A[employer]o is NOT to be used for urgent needs. For medical emergencies, dial 911.     IF YOU SMOKE OR USE TOBACCO PLEASE READ THE FOLLOWING:  Why is smoking bad for me?  Smoking increases the risk of heart disease, lung disease, vascular disease, stroke, and cancer. If you smoke, STOP!        IF YOU SMOKE OR USE TOBACCO PLEASE READ THE FOLLOWING:  Why is smoking bad for me?  Smoking increases the risk of heart disease, lung disease, vascular disease, stroke, and cancer. If you smoke, STOP!     For more information:  Quit Now [place]  -QUIT-NOW  [email or web address]

## 2025-07-22 ENCOUNTER — OFFICE VISIT (OUTPATIENT)
Dept: OTOLARYNGOLOGY | Facility: CLINIC | Age: 65
End: 2025-07-22
Payer: COMMERCIAL

## 2025-07-22 VITALS
DIASTOLIC BLOOD PRESSURE: 81 MMHG | HEIGHT: 65 IN | BODY MASS INDEX: 33.49 KG/M2 | WEIGHT: 201 LBS | SYSTOLIC BLOOD PRESSURE: 132 MMHG

## 2025-07-22 DIAGNOSIS — K13.70 LESION OF ORAL MUCOSA: Primary | ICD-10-CM

## 2025-07-22 NOTE — PROGRESS NOTES
YOB: 1960  Location: [facility]  Location Address: [address] [place] [address], [facility] [address], [address] [place] [address]  Location Phone: [phone]    Chief Complaint   Patient presents with    Mouth Lesions       History of Present Illness  Peggy Regan is a 64 y.o. female.  Peggy Regan is here for follow up of ENT complaints. The patient has had problems with oral lesion. This is localized to the bottom lip.  At the last office visit, she was started on miracle mouthwash. She has had resolution to symptoms.       Past Medical History:   Diagnosis Date    Arthritis     Asthma     Carotid artery occlusion 2023    Chronic kidney disease, stage 3a     Cough     Multifactorial and adversely affected by shy and obesity    Degenerative disc disease, lumbar     Degenerative disc disease, thoracic     Depression     Family history of colonic polyps     Fibromyalgia, primary     Functional dyspepsia     GERD (gastroesophageal reflux disease)     Head injury 2023    Showed on mri    Headache, tension-type     Years I have had HA    History of adenomatous polyp of colon     History of colon polyps     History of skin cancer     History of squamous cell carcinoma 2021    Hypercholesteremia     Hypertension     Memory loss 2022    I think memory going a couple years ago    Sleep apnea     CPAP    Spinal headache     Syncope     Vision loss     Wear glasses    Wears glasses        Past Surgical History:   Procedure Laterality Date    ANTERIOR CERVICAL FUSION  2016    BRAVO PROCEDURE  2011    Borderline test while off PPI; Day 1 suggestive of reflux disease and Day 2 is negative     SECTION      X2    CHOLECYSTECTOMY      COLONOSCOPY  10/29/2015    One 5mm polyp in the sigmoid colon-complete resection, polyp tissue not retrieved; Examination was otherwise normal on direct and retroflexion views; Repeat 5 years     COLONOSCOPY  2003    Normal; Repeat at age 50     COLONOSCOPY  11/01/2010    Internal hemorrhoids; One 1cm polyp in the descending colon-path shows benign submucosal leiomyoma; Repeat 3 years    COLONOSCOPY  06/16/1992    Dr. Arredondo-Normal colonoscopy    COLONOSCOPY N/A 04/28/2021    One 5mm adenomatous polyp in the ascending colon; One 4mm adenomatous polyp in the transverse colon; Three 4-6mm polyps in the descending colon-path shows two adenomatous and one hyperplastic polyp; The examination was otherwise normal on direct and retroflexion views; Repeat 3 years    COLONOSCOPY N/A 07/31/2024    One 8mm tubular adenomatous polyp in the cecum; One 7mm tubular adenomatous polyp in the transverse colon; Four 4-6mm tubular adenomatous polyps in the transverse colon; Repeat 3 years    ENDOSCOPY  01/11/2011    Normal esophagus; Normal stomach; Normal examined duodenum; BRAVO capsule placed     ENDOSCOPY  12/01/2010    Suboptimal view due to retained gastric contents; Will check SPGE scan; Will repeat endo/MAC with BRAVO replacement    ENDOSCOPY  08/18/2003    Normal; RTC 3 months    ENDOSCOPY  09/22/1994    Dr. Arredondo-Normal endoscopy; Slant taken for H.Pylori    ENDOSCOPY N/A 5/2/2025    Procedure: ESOPHAGOGASTRODUODENOSCOPY WITH ANESTHESIA;  Surgeon: Peggy Zuñiga MD;  Location: [facility] ENDOSCOPY;  Service: Gastroenterology;  Laterality: N/A;  preop; dysphagia  postop duodenal diverticulum ; gastric polyps;   PCP Radha Pepe    HYSTERECTOMY      KNEE ARTHROSCOPY      VENTRAL/INCISIONAL HERNIA REPAIR N/A 12/28/2016    Procedure: VENTRAL/INCISIONAL HERNIA REPAIR WITH MESH;  Surgeon: Mckenna Adair MD;  Location: [facility] OR;  Service:        Outpatient Medications Marked as Taking for the 7/22/25 encounter (Office Visit) with Shaw Pastrana MD   Medication Sig Dispense Refill    aspirin 81 MG EC tablet Take 1 tablet by mouth Daily. (Patient taking differently: Take 1 tablet by mouth Daily. Bela VERONICA)      buPROPion XL (WELLBUTRIN XL) 150 MG 24 hr tablet  Take 1 tablet by mouth Daily.      busPIRone (BUSPAR) 5 MG tablet Take 1 tablet by mouth 2 (Two) Times a Day.      Cholecalciferol (Vitamin D3) 75 MCG (3000 UT) tablet Take 1 capsule by mouth Daily.      cyclobenzaprine (FLEXERIL) 10 MG tablet Take 1 tablet by mouth 2 (Two) Times a Day.      docusate sodium (COLACE) 100 MG capsule Take 1 capsule by mouth As Needed for Constipation.      DULoxetine (CYMBALTA) 60 MG capsule Take 2 capsules by mouth Daily.      fenofibrate (TRICOR) 145 MG tablet Take 1 tablet by mouth Daily.      losartan (COZAAR) 25 MG tablet Take 1 tablet by mouth Daily. 90 tablet 0    montelukast (SINGULAIR) 10 MG tablet Take 1 tablet by mouth Daily.      pantoprazole (PROTONIX) 40 MG EC tablet Take 1 tablet by mouth 2 (Two) Times a Day.      polyethylene glycol (MiraLax) 17 g packet Take 17 g by mouth Daily.      simvastatin (ZOCOR) 40 MG tablet Take 1 tablet by mouth Daily.      sodium bicarbonate 325 MG tablet Take 1 tablet by mouth 2 (Two) Times a Day.      Symbicort 160-4.5 MCG/ACT inhaler Inhale 1 puff 2 (Two) Times a Day.      Vibegron (Gemtesa) 75 MG tablet Take 1 tablet by mouth Daily. 30 tablet 11       Tetracyclines & related, Erythromycin, and Penicillins    Family History   Problem Relation Age of Onset    Heart disease Mother     Colon polyps Mother         > 60 years of age     Dementia Mother     Stroke Mother     Colon polyps Father         > 60 years of age     Hypertension Sister     Migraines Sister     Neuropathy Sister         Trigeminal neuralgia    Colon cancer Neg Hx     Esophageal cancer Neg Hx     Liver cancer Neg Hx     Liver disease Neg Hx     Rectal cancer Neg Hx     Stomach cancer Neg Hx        Social History     Socioeconomic History    Marital status:    Tobacco Use    Smoking status: Never     Passive exposure: Past    Smokeless tobacco: Never   Vaping Use    Vaping status: Never Used   Substance and Sexual Activity    Alcohol use: Never    Drug use: Never     Sexual activity: Defer       Review of Systems   Constitutional: Negative.    HENT: Negative.         Vitals:    25 1104   BP: 132/81       Body mass index is 33.45 kg/m².    Objective     Physical Exam  Vitals reviewed.   Constitutional:       Appearance: Normal appearance. She is normal weight.   HENT:      Head: Normocephalic.      Right Ear: Tympanic membrane, ear canal and external ear normal.      Left Ear: Tympanic membrane, ear canal and external ear normal.      Nose: Nose normal.      Mouth/Throat:      Comments: No lesions noted today.  Neurological:      Mental Status: She is alert.   Psychiatric:         Behavior: Behavior is cooperative.         Assessment & Plan   Diagnoses and all orders for this visit:    1. Lesion of oral mucosa (Primary)  Comments:  resolved today      * Surgery not found *  No orders of the defined types were placed in this encounter.    Call with any new lesions     Return in about 6 months (around 2026), or if symptoms worsen or fail to improve, for Recheck.       Patient Instructions   CONTACT INFORMATION:  The main office phone number is [phone]. For emergencies after hours and on weekends, this number will convert over to our answering service and the on call provider will answer. Please try to keep non emergent phone calls/ questions to office hours 9am-5pm Monday through Friday.      [email or web address]  As an alternative, you can sign up and use the Epic MyChart system for more direct and quicker access for non emergent questions/ problems.  [facility] [email or web address] allows you to send messages to your doctor, view your test results, renew your prescriptions, schedule appointments, and more. To sign up, go to ComVibe and click on the Sign Up Now link in the New User? box. Enter your [email or web address] Activation Code exactly as it appears below along with the last four digits of your Social Security Number and your Date of Birth () to complete the sign-up  process. If you do not sign up before the expiration date, you must request a new code.     [employer] Activation Code: Activation code not generated  Current [employer] Status: Active     If you have questions, you can email [email or web address] or call [phone] to talk to our [employer] staff. Remember, MyChart is NOT to be used for urgent needs. For medical emergencies, dial 911.     IF YOU SMOKE OR USE TOBACCO PLEASE READ THE FOLLOWING:  Why is smoking bad for me?  Smoking increases the risk of heart disease, lung disease, vascular disease, stroke, and cancer. If you smoke, STOP!        IF YOU SMOKE OR USE TOBACCO PLEASE READ THE FOLLOWING:  Why is smoking bad for me?  Smoking increases the risk of heart disease, lung disease, vascular disease, stroke, and cancer. If you smoke, STOP!     For more information:  Quit Now [place]  1-800-QUIT-NOW  https://[email or web address].[email or web address]/en-US/

## 2025-07-22 NOTE — PATIENT INSTRUCTIONS
CONTACT INFORMATION:  The main office phone number is [phone]. For emergencies after hours and on weekends, this number will convert over to our answering service and the on call provider will answer. Please try to keep non emergent phone calls/ questions to office hours 9am-5pm Monday through Friday.      [employer]  As an alternative, you can sign up and use the Epic MyChart system for more direct and quicker access for non emergent questions/ problems.  [facility] allows you to send messages to your doctor, view your test results, renew your prescriptions, schedule appointments, and more. To sign up, go to JustBook and click on the Sign Up Now link in the New User? box. Enter your [employer] Activation Code exactly as it appears below along with the last four digits of your Social Security Number and your Date of Birth () to complete the sign-up process. If you do not sign up before the expiration date, you must request a new code.     [employer] Activation Code: Activation code not generated  Current [employer] Status: Active     If you have questions, you can email [email or web address] or call [phone] to talk to our [employer] staff. Remember, [employer] is NOT to be used for urgent needs. For medical emergencies, dial 911.     IF YOU SMOKE OR USE TOBACCO PLEASE READ THE FOLLOWING:  Why is smoking bad for me?  Smoking increases the risk of heart disease, lung disease, vascular disease, stroke, and cancer. If you smoke, STOP!        IF YOU SMOKE OR USE TOBACCO PLEASE READ THE FOLLOWING:  Why is smoking bad for me?  Smoking increases the risk of heart disease, lung disease, vascular disease, stroke, and cancer. If you smoke, STOP!     For more information:  Quit Now [place]  -QUIT-NOW  [email or web address]

## (undated) DEVICE — YANKAUER,BULB TIP WITH VENT: Brand: ARGYLE

## (undated) DEVICE — THE CHANNEL CLEANING BRUSH IS A NYLON FLEXI BRUSH ATTACHED TO A FLEXIBLE PLASTIC SHEATH DESIGNED TO SAFELY REMOVE DEBRIS FROM FLEXIBLE ENDOSCOPES.

## (undated) DEVICE — SNAR POLYP SENSATION MICRO OVL 13 240X40

## (undated) DEVICE — CONMED SCOPE SAVER BITE BLOCK, 20X27 MM: Brand: SCOPE SAVER

## (undated) DEVICE — DEV INFL ALLIANCE2 SYS

## (undated) DEVICE — CUFF,BP,DISP,1 TUBE,ADULT,HP: Brand: MEDLINE

## (undated) DEVICE — SENSR O2 OXIMAX FNGR A/ 18IN NONSTR

## (undated) DEVICE — THE SINGLE USE ETRAP – POLYP TRAP IS USED FOR SUCTION RETRIEVAL OF ENDOSCOPICALLY REMOVED POLYPS.: Brand: ETRAP

## (undated) DEVICE — MASK,OXYGEN,MED CONC,ADLT,7' TUB, UC: Brand: PENDING

## (undated) DEVICE — Device: Brand: DEFENDO AIR/WATER/SUCTION AND BIOPSY VALVE

## (undated) DEVICE — SINGLE-USE POLYPECTOMY SNARE: Brand: CAPTIVATOR II

## (undated) DEVICE — SNAR POLYP CAPTIVATOR RND STFF 2.4 240CM 10MM 1P/U

## (undated) DEVICE — DEFENDO AIR WATER SUCTION AND BIOPSY VALVE KIT FOR  OLYMPUS: Brand: DEFENDO AIR/WATER/SUCTION AND BIOPSY VALVE

## (undated) DEVICE — ESOPHAGEAL BALLOON DILATATION CATHETER: Brand: CRE FIXED WIRE

## (undated) DEVICE — TBG SMPL FLTR LINE NASL 02/C02 A/ BX/100

## (undated) DEVICE — FRCP BX RADJAW4 NDL 2.8 240 STD OG

## (undated) DEVICE — ARGYLE YANKAUER BULB TIP WITH VENT: Brand: ARGYLE